# Patient Record
Sex: MALE | Race: BLACK OR AFRICAN AMERICAN | Employment: FULL TIME | URBAN - METROPOLITAN AREA
[De-identification: names, ages, dates, MRNs, and addresses within clinical notes are randomized per-mention and may not be internally consistent; named-entity substitution may affect disease eponyms.]

---

## 2017-06-30 ENCOUNTER — APPOINTMENT (OUTPATIENT)
Dept: GENERAL RADIOLOGY | Facility: CLINIC | Age: 62
DRG: 287 | End: 2017-06-30
Attending: EMERGENCY MEDICINE
Payer: COMMERCIAL

## 2017-06-30 ENCOUNTER — HOSPITAL ENCOUNTER (INPATIENT)
Facility: CLINIC | Age: 62
LOS: 1 days | Discharge: HOME | DRG: 287 | End: 2017-07-01
Attending: EMERGENCY MEDICINE | Admitting: INTERNAL MEDICINE
Payer: COMMERCIAL

## 2017-06-30 DIAGNOSIS — K21.9 ESOPHAGEAL REFLUX: ICD-10-CM

## 2017-06-30 DIAGNOSIS — R79.89 ELEVATED TROPONIN: Primary | ICD-10-CM

## 2017-06-30 DIAGNOSIS — I24.9 ACS (ACUTE CORONARY SYNDROME) (H): ICD-10-CM

## 2017-06-30 DIAGNOSIS — I10 ESSENTIAL HYPERTENSION, MALIGNANT: ICD-10-CM

## 2017-06-30 DIAGNOSIS — R07.9 CHEST PAIN, UNSPECIFIED: ICD-10-CM

## 2017-06-30 LAB
ANION GAP SERPL CALCULATED.3IONS-SCNC: 11 MMOL/L (ref 3–14)
BASOPHILS # BLD AUTO: 0 10E9/L (ref 0–0.2)
BASOPHILS NFR BLD AUTO: 0.3 %
BUN SERPL-MCNC: 17 MG/DL (ref 7–30)
CALCIUM SERPL-MCNC: 9.6 MG/DL (ref 8.5–10.1)
CHLORIDE SERPL-SCNC: 103 MMOL/L (ref 94–109)
CO2 SERPL-SCNC: 26 MMOL/L (ref 20–32)
CREAT SERPL-MCNC: 1.01 MG/DL (ref 0.66–1.25)
DIFFERENTIAL METHOD BLD: NORMAL
EOSINOPHIL # BLD AUTO: 0.1 10E9/L (ref 0–0.7)
EOSINOPHIL NFR BLD AUTO: 1.1 %
ERYTHROCYTE [DISTWIDTH] IN BLOOD BY AUTOMATED COUNT: 13.6 % (ref 10–15)
GFR SERPL CREATININE-BSD FRML MDRD: 75 ML/MIN/1.7M2
GLUCOSE SERPL-MCNC: 101 MG/DL (ref 70–99)
HCT VFR BLD AUTO: 44 % (ref 40–53)
HGB BLD-MCNC: 14.4 G/DL (ref 13.3–17.7)
IMM GRANULOCYTES # BLD: 0 10E9/L (ref 0–0.4)
IMM GRANULOCYTES NFR BLD: 0.1 %
LYMPHOCYTES # BLD AUTO: 1.8 10E9/L (ref 0.8–5.3)
LYMPHOCYTES NFR BLD AUTO: 25.2 %
MCH RBC QN AUTO: 27.2 PG (ref 26.5–33)
MCHC RBC AUTO-ENTMCNC: 32.7 G/DL (ref 31.5–36.5)
MCV RBC AUTO: 83 FL (ref 78–100)
MONOCYTES # BLD AUTO: 0.5 10E9/L (ref 0–1.3)
MONOCYTES NFR BLD AUTO: 7.4 %
NEUTROPHILS # BLD AUTO: 4.8 10E9/L (ref 1.6–8.3)
NEUTROPHILS NFR BLD AUTO: 65.9 %
NRBC # BLD AUTO: 0 10*3/UL
NRBC BLD AUTO-RTO: 0 /100
PLATELET # BLD AUTO: 251 10E9/L (ref 150–450)
POTASSIUM SERPL-SCNC: 4.1 MMOL/L (ref 3.4–5.3)
RBC # BLD AUTO: 5.29 10E12/L (ref 4.4–5.9)
SODIUM SERPL-SCNC: 140 MMOL/L (ref 133–144)
TROPONIN I SERPL-MCNC: 0.04 UG/L (ref 0–0.04)
WBC # BLD AUTO: 7.2 10E9/L (ref 4–11)

## 2017-06-30 PROCEDURE — 84484 ASSAY OF TROPONIN QUANT: CPT | Performed by: EMERGENCY MEDICINE

## 2017-06-30 PROCEDURE — 84484 ASSAY OF TROPONIN QUANT: CPT | Performed by: NURSE PRACTITIONER

## 2017-06-30 PROCEDURE — 93010 ELECTROCARDIOGRAM REPORT: CPT | Performed by: INTERNAL MEDICINE

## 2017-06-30 PROCEDURE — 25000128 H RX IP 250 OP 636: Performed by: EMERGENCY MEDICINE

## 2017-06-30 PROCEDURE — 93010 ELECTROCARDIOGRAM REPORT: CPT | Mod: 59 | Performed by: EMERGENCY MEDICINE

## 2017-06-30 PROCEDURE — 99207 ZZC APP CREDIT; MD BILLING SHARED VISIT: CPT | Mod: 25 | Performed by: EMERGENCY MEDICINE

## 2017-06-30 PROCEDURE — 71020 XR CHEST 2 VW: CPT

## 2017-06-30 PROCEDURE — 99219 ZZC INITIAL OBSERVATION CARE,LEVL II: CPT | Mod: Z6 | Performed by: NURSE PRACTITIONER

## 2017-06-30 PROCEDURE — 25000132 ZZH RX MED GY IP 250 OP 250 PS 637: Performed by: EMERGENCY MEDICINE

## 2017-06-30 PROCEDURE — 99285 EMERGENCY DEPT VISIT HI MDM: CPT | Mod: 25 | Performed by: EMERGENCY MEDICINE

## 2017-06-30 PROCEDURE — 93005 ELECTROCARDIOGRAM TRACING: CPT

## 2017-06-30 PROCEDURE — G0378 HOSPITAL OBSERVATION PER HR: HCPCS

## 2017-06-30 PROCEDURE — 96360 HYDRATION IV INFUSION INIT: CPT | Performed by: EMERGENCY MEDICINE

## 2017-06-30 PROCEDURE — 96361 HYDRATE IV INFUSION ADD-ON: CPT | Performed by: EMERGENCY MEDICINE

## 2017-06-30 PROCEDURE — 36415 COLL VENOUS BLD VENIPUNCTURE: CPT | Performed by: NURSE PRACTITIONER

## 2017-06-30 PROCEDURE — 80048 BASIC METABOLIC PNL TOTAL CA: CPT | Performed by: EMERGENCY MEDICINE

## 2017-06-30 PROCEDURE — 93308 TTE F-UP OR LMTD: CPT | Mod: 26 | Performed by: EMERGENCY MEDICINE

## 2017-06-30 PROCEDURE — 85025 COMPLETE CBC W/AUTO DIFF WBC: CPT | Performed by: EMERGENCY MEDICINE

## 2017-06-30 PROCEDURE — 83735 ASSAY OF MAGNESIUM: CPT | Performed by: NURSE PRACTITIONER

## 2017-06-30 PROCEDURE — 93005 ELECTROCARDIOGRAM TRACING: CPT | Performed by: EMERGENCY MEDICINE

## 2017-06-30 PROCEDURE — 93308 TTE F-UP OR LMTD: CPT | Performed by: EMERGENCY MEDICINE

## 2017-06-30 RX ORDER — ALUMINA, MAGNESIA, AND SIMETHICONE 2400; 2400; 240 MG/30ML; MG/30ML; MG/30ML
15-30 SUSPENSION ORAL EVERY 4 HOURS PRN
Status: DISCONTINUED | OUTPATIENT
Start: 2017-06-30 | End: 2017-07-01 | Stop reason: HOSPADM

## 2017-06-30 RX ORDER — ASPIRIN 81 MG/1
81 TABLET, CHEWABLE ORAL DAILY
Status: DISCONTINUED | OUTPATIENT
Start: 2017-07-01 | End: 2017-07-01

## 2017-06-30 RX ORDER — NALOXONE HYDROCHLORIDE 0.4 MG/ML
.1-.4 INJECTION, SOLUTION INTRAMUSCULAR; INTRAVENOUS; SUBCUTANEOUS
Status: DISCONTINUED | OUTPATIENT
Start: 2017-06-30 | End: 2017-07-01 | Stop reason: HOSPADM

## 2017-06-30 RX ORDER — ACETAMINOPHEN 325 MG/1
650 TABLET ORAL EVERY 4 HOURS PRN
Status: DISCONTINUED | OUTPATIENT
Start: 2017-06-30 | End: 2017-07-01 | Stop reason: HOSPADM

## 2017-06-30 RX ORDER — LISINOPRIL 10 MG/1
10 TABLET ORAL DAILY
Status: DISCONTINUED | OUTPATIENT
Start: 2017-07-01 | End: 2017-06-30

## 2017-06-30 RX ORDER — LISINOPRIL 20 MG/1
20 TABLET ORAL DAILY
Status: DISCONTINUED | OUTPATIENT
Start: 2017-07-01 | End: 2017-07-01 | Stop reason: HOSPADM

## 2017-06-30 RX ORDER — SODIUM CHLORIDE 9 MG/ML
1000 INJECTION, SOLUTION INTRAVENOUS CONTINUOUS
Status: DISCONTINUED | OUTPATIENT
Start: 2017-06-30 | End: 2017-07-01 | Stop reason: HOSPADM

## 2017-06-30 RX ORDER — HYDROCHLOROTHIAZIDE 12.5 MG/1
25 CAPSULE ORAL DAILY
Status: DISCONTINUED | OUTPATIENT
Start: 2017-07-01 | End: 2017-06-30

## 2017-06-30 RX ORDER — NITROGLYCERIN 0.4 MG/1
0.4 TABLET SUBLINGUAL EVERY 5 MIN PRN
Status: DISCONTINUED | OUTPATIENT
Start: 2017-06-30 | End: 2017-07-01 | Stop reason: HOSPADM

## 2017-06-30 RX ORDER — LIDOCAINE 40 MG/G
CREAM TOPICAL
Status: DISCONTINUED | OUTPATIENT
Start: 2017-06-30 | End: 2017-07-01 | Stop reason: HOSPADM

## 2017-06-30 RX ORDER — ASPIRIN 81 MG/1
162 TABLET, CHEWABLE ORAL ONCE
Status: COMPLETED | OUTPATIENT
Start: 2017-06-30 | End: 2017-06-30

## 2017-06-30 RX ORDER — ACETAMINOPHEN 650 MG/1
650 SUPPOSITORY RECTAL EVERY 4 HOURS PRN
Status: DISCONTINUED | OUTPATIENT
Start: 2017-06-30 | End: 2017-07-01 | Stop reason: HOSPADM

## 2017-06-30 RX ADMIN — SODIUM CHLORIDE 1000 ML: 9 INJECTION, SOLUTION INTRAVENOUS at 21:31

## 2017-06-30 RX ADMIN — ASPIRIN 81 MG 162 MG: 81 TABLET ORAL at 20:08

## 2017-06-30 RX ADMIN — SODIUM CHLORIDE 1000 ML: 9 INJECTION, SOLUTION INTRAVENOUS at 20:24

## 2017-06-30 NOTE — IP AVS SNAPSHOT
[address] Observ[address] [address]    Phone:  [phone]    Fax:  [phone]                                       After Visit Summary   6/30/2017    Leandro Womack    MRN: [id number]           After Visit Summary Signature Page     I have received my discharge instructions, and my questions have been answered. I have discussed any challenges I see with this plan with the nurse or doctor.    ..........................................................................................................................................  Patient/Patient Representative Signature      ..........................................................................................................................................  Patient Representative Print Name and Relationship to Patient    ..................................................               ................................................  Date                                            Time    ..........................................................................................................................................  Reviewed by Signature/Title    ...................................................              ..............................................  Date                                                            Time

## 2017-06-30 NOTE — IP AVS SNAPSHOT
MRN:[id number]                      After Visit Summary   6/30/2017    Leandro Womack    MRN: [id number]           Thank you!     Thank you for choosing [facility] for your care. Our goal is always to provide you with excellent care. Hearing back from our patients is one way we can continue to improve our services. Please take a few minutes to complete the written survey that you may receive in the mail after you visit with us. Thank you!        Patient Information     Date Of Birth          1955        Designated Caregiver       Most Recent Value    Caregiver    Will someone help with your care after discharge? yes    Name of designated caregiver JAMES    Phone number of caregiver [phone]    Caregiver address [address] [address]      About your hospital stay     You were admitted on:  June 30, 2017 You last received care in the:  Unit [facility] Observation [facility]    You were discharged on:  July 1, 2017        Reason for your hospital stay       You were admitted to the hospital because your blood tests indicated damage to the heart.  We performed a coronary angiogram to determine if there were plaques in the blood vessels to your heart and if you had a heart attack.  The coronary angiogram was normal.  The damage to your heart may have been due to inflammation, or irritation of the heart muscle which is most commonly due to a viral infection, even if you did not have any symptoms.  You should have a cardiac MRI ([facility] should call you to schedule it) to evaluate your heart further.  You should follow up with Dr. Ryanne Alanis in our cardiology clinic after the MRI.                  Who to Call     For medical emergencies, please call [phone].  For non-urgent questions about your medical care, please call your primary care provider or clinic, [phone]          Attending Provider     Provider Specialty    Zachery Domínguez MD Emergency Medicine    Morgan Rowan MD Emergency Medicine     Ryanne Alanis MD Internal Medicine       Primary Care Provider Office Phone # Fax #    Sukhi Juarez [phone]      After Care Instructions     Activity       Your activity upon discharge: Avoid vigorous exertion (jogging, running, biking, etc) until you follow up with Dr. Alanis.            Diet       Follow this diet upon discharge: Low salt (sodium <2400 mg per day) diet.                  Follow-up Appointments     Adult [facility] Follow-up and recommended labs and tests       Patient is to have a cardiac MRI.  Patient should follow up with Dr. Ryanne Alanis, at [facility], after the cardiac MRI.    Appointments on [place] and/or [facility] (with [facility] or [facility] provider or service). Call [phone] if you haven't heard regarding these appointments within 7 days of discharge.                  Future tests that were ordered for you     MRI Cardiac w/contrast       Administration of IV contrast (contrast agent, dose, and amount) will be tailored to this examination per the appropriate written protocol listed in the Protocol E-Book, or by the supervising imaging provider.                  Pending Results     Date and Time Order Name Status Description    7/1/2017 0737 EKG 12-lead, complete Preliminary     6/30/2017 2321 EKG 12-lead, complete Preliminary     6/30/2017 2016 EKG 12 lead Preliminary     6/30/2017 1949 POC US ECHO LIMITED In process             Statement of Approval     Ordered          07/01/17 1606  I have reviewed and agree with all the recommendations and orders detailed in this document.  EFFECTIVE NOW     Approved and electronically signed by:  Gamaliel Mckeon MD             Admission Information     Date & Time Provider Department Dept. Phone    6/30/2017 Ryanne Alanis MD Unit 6D Observation [facility] [place] [phone]      Your Vitals Were     Blood Pressure Pulse Temperature Respirations Weight Pulse Oximetry    114/78 (BP Location: Left  "arm) 104 98.3  F (36.8  C) (Oral) 18 192.1 kg (423 lb 6.4 oz) 98%      MyChart Information     [employer] lets you send messages to your doctor, view your test results, renew your prescriptions, schedule appointments and more. To sign up, go to [email or web address] . Click on \"Log in\" on the left side of the screen, which will take you to the Welcome page. Then click on \"Sign up Now\" on the right side of the page.     You will be asked to enter the access code listed below, as well as some personal information. Please follow the directions to create your username and password.     Your access code is: [email or web address]  Expires: 2017  4:10 PM     Your access code will  in 90 days. If you need help or a new code, please call your [facility] clinic or [phone].        Care EveryWhere ID     This is your Care EveryWhere ID. This could be used by other organizations to access your [facility] medical records  [id number]        Equal Access to Services     [facility] : Hadsree Camilo, waindy mccray, qamallory evangelistaalnancy campbell, gio simmons. So [facility] [phone].    ATENCIÓN: Si habla español, tiene a ni disposición servicios gratuitos de asistencia lingüística. Jojo al [phone].    We comply with applicable federal civil rights laws and [place] laws. We do not discriminate on the basis of race, color, national origin, age, disability sex, sexual orientation or gender identity.               Review of your medicines      CONTINUE these medicines which have NOT CHANGED        Dose / Directions    ASPIRIN PO        Dose:  81 mg   Take 81 mg by mouth daily   Refills:  0       DICLOFENAC SODIUM PO        Dose:  50 mg   Take 50 mg by mouth 2 times daily   Refills:  0       HYDROCHLOROTHIAZIDE PO        Dose:  25 mg   Take 25 mg by mouth daily   Refills:  0       LISINOPRIL PO        Dose:  10 mg   Take 10 mg by mouth daily   Refills:  0       OMEPRAZOLE PO        " Dose:  20 mg   Take 20 mg by mouth every morning   Refills:  0                Protect others around you: Learn how to safely use, store and throw away your medicines at www.disposemymeds.org.             Medication List: This is a list of all your medications and when to take them. Check marks below indicate your daily home schedule. Keep this list as a reference.      Medications           Morning Afternoon Evening Bedtime As Needed    ASPIRIN PO   Take 81 mg by mouth daily   Last time this was given:  81 mg on 7/1/2017  9:13 AM                                DICLOFENAC SODIUM PO   Take 50 mg by mouth 2 times daily                                HYDROCHLOROTHIAZIDE PO   Take 25 mg by mouth daily                                LISINOPRIL PO   Take 10 mg by mouth daily   Last time this was given:  20 mg on 7/1/2017  9:10 AM                                OMEPRAZOLE PO   Take 20 mg by mouth every morning

## 2017-07-01 ENCOUNTER — APPOINTMENT (OUTPATIENT)
Dept: CARDIOLOGY | Facility: CLINIC | Age: 62
DRG: 287 | End: 2017-07-01
Attending: STUDENT IN AN ORGANIZED HEALTH CARE EDUCATION/TRAINING PROGRAM
Payer: COMMERCIAL

## 2017-07-01 VITALS
WEIGHT: 315 LBS | RESPIRATION RATE: 18 BRPM | OXYGEN SATURATION: 98 % | DIASTOLIC BLOOD PRESSURE: 78 MMHG | SYSTOLIC BLOOD PRESSURE: 114 MMHG | HEART RATE: 104 BPM | TEMPERATURE: 98.3 F

## 2017-07-01 PROBLEM — I21.4 NSTEMI (NON-ST ELEVATED MYOCARDIAL INFARCTION) (H): Status: ACTIVE | Noted: 2017-07-01

## 2017-07-01 LAB
CHOLEST SERPL-MCNC: 142 MG/DL
HDLC SERPL-MCNC: 52 MG/DL
LDLC SERPL CALC-MCNC: 73 MG/DL
MAGNESIUM SERPL-MCNC: 1.8 MG/DL (ref 1.6–2.3)
NONHDLC SERPL-MCNC: 90 MG/DL
TRIGL SERPL-MCNC: 85 MG/DL
TROPONIN I SERPL-MCNC: 0.44 UG/L (ref 0–0.04)
TROPONIN I SERPL-MCNC: 0.62 UG/L (ref 0–0.04)

## 2017-07-01 PROCEDURE — 36415 COLL VENOUS BLD VENIPUNCTURE: CPT | Performed by: NURSE PRACTITIONER

## 2017-07-01 PROCEDURE — 84484 ASSAY OF TROPONIN QUANT: CPT | Performed by: NURSE PRACTITIONER

## 2017-07-01 PROCEDURE — 27210946 ZZH KIT HC TOTES DISP CR8

## 2017-07-01 PROCEDURE — 40000141 ZZH STATISTIC PERIPHERAL IV START W/O US GUIDANCE

## 2017-07-01 PROCEDURE — 27210762 ZZH DEVICE SUTURELESS SECUREMENT EA CR2

## 2017-07-01 PROCEDURE — 99238 HOSP IP/OBS DSCHRG MGMT 30/<: CPT | Mod: 25 | Performed by: INTERNAL MEDICINE

## 2017-07-01 PROCEDURE — 80061 LIPID PANEL: CPT | Performed by: NURSE PRACTITIONER

## 2017-07-01 PROCEDURE — C1894 INTRO/SHEATH, NON-LASER: HCPCS

## 2017-07-01 PROCEDURE — 25500064 ZZH RX 255 OP 636: Performed by: INTERNAL MEDICINE

## 2017-07-01 PROCEDURE — 93306 TTE W/DOPPLER COMPLETE: CPT | Mod: 26 | Performed by: INTERNAL MEDICINE

## 2017-07-01 PROCEDURE — C1769 GUIDE WIRE: HCPCS

## 2017-07-01 PROCEDURE — 25000128 H RX IP 250 OP 636: Performed by: STUDENT IN AN ORGANIZED HEALTH CARE EDUCATION/TRAINING PROGRAM

## 2017-07-01 PROCEDURE — 12000001 ZZH R&B MED SURG/OB UMMC

## 2017-07-01 PROCEDURE — 25000132 ZZH RX MED GY IP 250 OP 250 PS 637: Performed by: NURSE PRACTITIONER

## 2017-07-01 PROCEDURE — 27210787 ZZH MANIFOLD CR2

## 2017-07-01 PROCEDURE — 25000128 H RX IP 250 OP 636: Performed by: EMERGENCY MEDICINE

## 2017-07-01 PROCEDURE — 93458 L HRT ARTERY/VENTRICLE ANGIO: CPT

## 2017-07-01 PROCEDURE — 93458 L HRT ARTERY/VENTRICLE ANGIO: CPT | Mod: 26 | Performed by: INTERNAL MEDICINE

## 2017-07-01 PROCEDURE — B2111ZZ FLUOROSCOPY OF MULTIPLE CORONARY ARTERIES USING LOW OSMOLAR CONTRAST: ICD-10-PCS | Performed by: INTERNAL MEDICINE

## 2017-07-01 PROCEDURE — 27210843 ZZH DEVICE COMPRESSION CR12

## 2017-07-01 PROCEDURE — G0378 HOSPITAL OBSERVATION PER HR: HCPCS

## 2017-07-01 PROCEDURE — 93005 ELECTROCARDIOGRAM TRACING: CPT

## 2017-07-01 PROCEDURE — 25000125 ZZHC RX 250: Performed by: NURSE PRACTITIONER

## 2017-07-01 PROCEDURE — 27211089 ZZH KIT ACIST INJECTOR CR3

## 2017-07-01 PROCEDURE — 4A023N7 MEASUREMENT OF CARDIAC SAMPLING AND PRESSURE, LEFT HEART, PERCUTANEOUS APPROACH: ICD-10-PCS | Performed by: INTERNAL MEDICINE

## 2017-07-01 PROCEDURE — 25000132 ZZH RX MED GY IP 250 OP 250 PS 637: Performed by: STUDENT IN AN ORGANIZED HEALTH CARE EDUCATION/TRAINING PROGRAM

## 2017-07-01 PROCEDURE — 93010 ELECTROCARDIOGRAM REPORT: CPT | Performed by: INTERNAL MEDICINE

## 2017-07-01 PROCEDURE — 25000125 ZZHC RX 250: Performed by: STUDENT IN AN ORGANIZED HEALTH CARE EDUCATION/TRAINING PROGRAM

## 2017-07-01 PROCEDURE — 40000264 ECHO COMPLETE WITH OPTISON

## 2017-07-01 PROCEDURE — B2151ZZ FLUOROSCOPY OF LEFT HEART USING LOW OSMOLAR CONTRAST: ICD-10-PCS | Performed by: INTERNAL MEDICINE

## 2017-07-01 RX ORDER — NALOXONE HYDROCHLORIDE 0.4 MG/ML
.1-.4 INJECTION, SOLUTION INTRAMUSCULAR; INTRAVENOUS; SUBCUTANEOUS
Status: DISCONTINUED | OUTPATIENT
Start: 2017-07-01 | End: 2017-07-01

## 2017-07-01 RX ORDER — METHYLPREDNISOLONE SODIUM SUCCINATE 125 MG/2ML
125 INJECTION, POWDER, LYOPHILIZED, FOR SOLUTION INTRAMUSCULAR; INTRAVENOUS
Status: DISCONTINUED | OUTPATIENT
Start: 2017-07-01 | End: 2017-07-01

## 2017-07-01 RX ORDER — HEPARIN SODIUM 10000 [USP'U]/100ML
0-3500 INJECTION, SOLUTION INTRAVENOUS CONTINUOUS
Status: DISCONTINUED | OUTPATIENT
Start: 2017-07-01 | End: 2017-07-01 | Stop reason: HOSPADM

## 2017-07-01 RX ORDER — ATROPINE SULFATE 0.1 MG/ML
.5-1 INJECTION INTRAVENOUS
Status: DISCONTINUED | OUTPATIENT
Start: 2017-07-01 | End: 2017-07-01

## 2017-07-01 RX ORDER — FLUMAZENIL 0.1 MG/ML
0.2 INJECTION, SOLUTION INTRAVENOUS
Status: DISCONTINUED | OUTPATIENT
Start: 2017-07-01 | End: 2017-07-01

## 2017-07-01 RX ORDER — VERAPAMIL HYDROCHLORIDE 2.5 MG/ML
1-5 INJECTION, SOLUTION INTRAVENOUS
Status: DISCONTINUED | OUTPATIENT
Start: 2017-07-01 | End: 2017-07-01

## 2017-07-01 RX ORDER — METOPROLOL TARTRATE 1 MG/ML
5 INJECTION, SOLUTION INTRAVENOUS EVERY 5 MIN PRN
Status: DISCONTINUED | OUTPATIENT
Start: 2017-07-01 | End: 2017-07-01

## 2017-07-01 RX ORDER — ENALAPRILAT 1.25 MG/ML
1.25-2.5 INJECTION INTRAVENOUS
Status: DISCONTINUED | OUTPATIENT
Start: 2017-07-01 | End: 2017-07-01

## 2017-07-01 RX ORDER — NALOXONE HYDROCHLORIDE 0.4 MG/ML
.2-.4 INJECTION, SOLUTION INTRAMUSCULAR; INTRAVENOUS; SUBCUTANEOUS
Status: DISCONTINUED | OUTPATIENT
Start: 2017-07-01 | End: 2017-07-01

## 2017-07-01 RX ORDER — EPTIFIBATIDE 2 MG/ML
180 INJECTION, SOLUTION INTRAVENOUS EVERY 10 MIN PRN
Status: DISCONTINUED | OUTPATIENT
Start: 2017-07-01 | End: 2017-07-01

## 2017-07-01 RX ORDER — LIDOCAINE HYDROCHLORIDE 10 MG/ML
30 INJECTION, SOLUTION EPIDURAL; INFILTRATION; INTRACAUDAL; PERINEURAL
Status: DISCONTINUED | OUTPATIENT
Start: 2017-07-01 | End: 2017-07-01

## 2017-07-01 RX ORDER — NICARDIPINE HYDROCHLORIDE 2.5 MG/ML
100 INJECTION INTRAVENOUS
Status: DISCONTINUED | OUTPATIENT
Start: 2017-07-01 | End: 2017-07-01

## 2017-07-01 RX ORDER — FENTANYL CITRATE 50 UG/ML
25-50 INJECTION, SOLUTION INTRAMUSCULAR; INTRAVENOUS
Status: DISCONTINUED | OUTPATIENT
Start: 2017-07-01 | End: 2017-07-01

## 2017-07-01 RX ORDER — PROTAMINE SULFATE 10 MG/ML
1-5 INJECTION, SOLUTION INTRAVENOUS
Status: DISCONTINUED | OUTPATIENT
Start: 2017-07-01 | End: 2017-07-01

## 2017-07-01 RX ORDER — FLUMAZENIL 0.1 MG/ML
0.2 INJECTION, SOLUTION INTRAVENOUS
Status: DISCONTINUED | OUTPATIENT
Start: 2017-07-01 | End: 2017-07-01 | Stop reason: HOSPADM

## 2017-07-01 RX ORDER — HYDRALAZINE HYDROCHLORIDE 20 MG/ML
10-20 INJECTION INTRAMUSCULAR; INTRAVENOUS
Status: DISCONTINUED | OUTPATIENT
Start: 2017-07-01 | End: 2017-07-01

## 2017-07-01 RX ORDER — PRASUGREL 10 MG/1
10-60 TABLET, FILM COATED ORAL
Status: DISCONTINUED | OUTPATIENT
Start: 2017-07-01 | End: 2017-07-01

## 2017-07-01 RX ORDER — FENTANYL CITRATE 50 UG/ML
25-50 INJECTION, SOLUTION INTRAMUSCULAR; INTRAVENOUS
Status: DISCONTINUED | OUTPATIENT
Start: 2017-07-01 | End: 2017-07-01 | Stop reason: HOSPADM

## 2017-07-01 RX ORDER — ARGATROBAN 1 MG/ML
150 INJECTION, SOLUTION INTRAVENOUS
Status: DISCONTINUED | OUTPATIENT
Start: 2017-07-01 | End: 2017-07-01

## 2017-07-01 RX ORDER — ONDANSETRON 2 MG/ML
4 INJECTION INTRAMUSCULAR; INTRAVENOUS EVERY 4 HOURS PRN
Status: DISCONTINUED | OUTPATIENT
Start: 2017-07-01 | End: 2017-07-01

## 2017-07-01 RX ORDER — HEPARIN SODIUM 1000 [USP'U]/ML
1000-10000 INJECTION, SOLUTION INTRAVENOUS; SUBCUTANEOUS EVERY 5 MIN PRN
Status: DISCONTINUED | OUTPATIENT
Start: 2017-07-01 | End: 2017-07-01

## 2017-07-01 RX ORDER — ARGATROBAN 1 MG/ML
350 INJECTION, SOLUTION INTRAVENOUS
Status: DISCONTINUED | OUTPATIENT
Start: 2017-07-01 | End: 2017-07-01

## 2017-07-01 RX ORDER — PROMETHAZINE HYDROCHLORIDE 25 MG/ML
6.25-25 INJECTION, SOLUTION INTRAMUSCULAR; INTRAVENOUS EVERY 4 HOURS PRN
Status: DISCONTINUED | OUTPATIENT
Start: 2017-07-01 | End: 2017-07-01

## 2017-07-01 RX ORDER — DOBUTAMINE HYDROCHLORIDE 200 MG/100ML
2-20 INJECTION INTRAVENOUS CONTINUOUS PRN
Status: DISCONTINUED | OUTPATIENT
Start: 2017-07-01 | End: 2017-07-01

## 2017-07-01 RX ORDER — POTASSIUM CHLORIDE 7.45 MG/ML
10 INJECTION INTRAVENOUS
Status: DISCONTINUED | OUTPATIENT
Start: 2017-07-01 | End: 2017-07-01

## 2017-07-01 RX ORDER — NITROGLYCERIN 5 MG/ML
100-500 VIAL (ML) INTRAVENOUS
Status: COMPLETED | OUTPATIENT
Start: 2017-07-01 | End: 2017-07-01

## 2017-07-01 RX ORDER — PHENYLEPHRINE HCL IN 0.9% NACL 1 MG/10 ML
20-100 SYRINGE (ML) INTRAVENOUS
Status: DISCONTINUED | OUTPATIENT
Start: 2017-07-01 | End: 2017-07-01

## 2017-07-01 RX ORDER — IOPAMIDOL 755 MG/ML
100 INJECTION, SOLUTION INTRAVASCULAR ONCE
Status: COMPLETED | OUTPATIENT
Start: 2017-07-01 | End: 2017-07-01

## 2017-07-01 RX ORDER — EPTIFIBATIDE 2 MG/ML
15 INJECTION, SOLUTION INTRAVENOUS CONTINUOUS PRN
Status: DISCONTINUED | OUTPATIENT
Start: 2017-07-01 | End: 2017-07-01

## 2017-07-01 RX ORDER — ADENOSINE 3 MG/ML
12-12000 INJECTION, SOLUTION INTRAVENOUS
Status: DISCONTINUED | OUTPATIENT
Start: 2017-07-01 | End: 2017-07-01

## 2017-07-01 RX ORDER — NIFEDIPINE 10 MG/1
10 CAPSULE ORAL
Status: DISCONTINUED | OUTPATIENT
Start: 2017-07-01 | End: 2017-07-01

## 2017-07-01 RX ORDER — CLOPIDOGREL BISULFATE 75 MG/1
75 TABLET ORAL
Status: DISCONTINUED | OUTPATIENT
Start: 2017-07-01 | End: 2017-07-01

## 2017-07-01 RX ORDER — SODIUM NITROPRUSSIDE 25 MG/ML
100-200 INJECTION INTRAVENOUS
Status: DISCONTINUED | OUTPATIENT
Start: 2017-07-01 | End: 2017-07-01

## 2017-07-01 RX ORDER — PROTAMINE SULFATE 10 MG/ML
25-100 INJECTION, SOLUTION INTRAVENOUS EVERY 5 MIN PRN
Status: DISCONTINUED | OUTPATIENT
Start: 2017-07-01 | End: 2017-07-01

## 2017-07-01 RX ORDER — ASPIRIN 81 MG/1
81 TABLET ORAL DAILY
Status: DISCONTINUED | OUTPATIENT
Start: 2017-07-02 | End: 2017-07-01 | Stop reason: HOSPADM

## 2017-07-01 RX ORDER — NITROGLYCERIN 0.4 MG/1
0.4 TABLET SUBLINGUAL EVERY 5 MIN PRN
Status: DISCONTINUED | OUTPATIENT
Start: 2017-07-01 | End: 2017-07-01

## 2017-07-01 RX ORDER — FUROSEMIDE 10 MG/ML
20-100 INJECTION INTRAMUSCULAR; INTRAVENOUS
Status: DISCONTINUED | OUTPATIENT
Start: 2017-07-01 | End: 2017-07-01

## 2017-07-01 RX ORDER — NITROGLYCERIN 20 MG/100ML
.07-1.04 INJECTION INTRAVENOUS CONTINUOUS PRN
Status: DISCONTINUED | OUTPATIENT
Start: 2017-07-01 | End: 2017-07-01

## 2017-07-01 RX ORDER — CLOPIDOGREL BISULFATE 75 MG/1
300-600 TABLET ORAL
Status: DISCONTINUED | OUTPATIENT
Start: 2017-07-01 | End: 2017-07-01

## 2017-07-01 RX ORDER — NITROGLYCERIN 5 MG/ML
100-200 VIAL (ML) INTRAVENOUS
Status: DISCONTINUED | OUTPATIENT
Start: 2017-07-01 | End: 2017-07-01

## 2017-07-01 RX ORDER — POTASSIUM CHLORIDE 29.8 MG/ML
20 INJECTION INTRAVENOUS
Status: DISCONTINUED | OUTPATIENT
Start: 2017-07-01 | End: 2017-07-01

## 2017-07-01 RX ORDER — ASPIRIN 81 MG/1
81-324 TABLET, CHEWABLE ORAL
Status: DISCONTINUED | OUTPATIENT
Start: 2017-07-01 | End: 2017-07-01

## 2017-07-01 RX ORDER — EPTIFIBATIDE 2 MG/ML
1 INJECTION, SOLUTION INTRAVENOUS CONTINUOUS PRN
Status: DISCONTINUED | OUTPATIENT
Start: 2017-07-01 | End: 2017-07-01

## 2017-07-01 RX ORDER — LIDOCAINE 40 MG/G
CREAM TOPICAL
Status: DISCONTINUED | OUTPATIENT
Start: 2017-07-01 | End: 2017-07-01 | Stop reason: HOSPADM

## 2017-07-01 RX ORDER — DIPHENHYDRAMINE HYDROCHLORIDE 50 MG/ML
25-50 INJECTION INTRAMUSCULAR; INTRAVENOUS
Status: DISCONTINUED | OUTPATIENT
Start: 2017-07-01 | End: 2017-07-01

## 2017-07-01 RX ORDER — DEXTROSE MONOHYDRATE 25 G/50ML
12.5-5 INJECTION, SOLUTION INTRAVENOUS EVERY 30 MIN PRN
Status: DISCONTINUED | OUTPATIENT
Start: 2017-07-01 | End: 2017-07-01

## 2017-07-01 RX ORDER — DOPAMINE HYDROCHLORIDE 160 MG/100ML
2-20 INJECTION, SOLUTION INTRAVENOUS CONTINUOUS PRN
Status: DISCONTINUED | OUTPATIENT
Start: 2017-07-01 | End: 2017-07-01

## 2017-07-01 RX ORDER — LORAZEPAM 2 MG/ML
.5-2 INJECTION INTRAMUSCULAR EVERY 4 HOURS PRN
Status: DISCONTINUED | OUTPATIENT
Start: 2017-07-01 | End: 2017-07-01

## 2017-07-01 RX ORDER — ASPIRIN 325 MG
325 TABLET ORAL
Status: DISCONTINUED | OUTPATIENT
Start: 2017-07-01 | End: 2017-07-01

## 2017-07-01 RX ORDER — ATROPINE SULFATE 0.1 MG/ML
0.5 INJECTION INTRAVENOUS EVERY 5 MIN PRN
Status: DISCONTINUED | OUTPATIENT
Start: 2017-07-01 | End: 2017-07-01 | Stop reason: HOSPADM

## 2017-07-01 RX ORDER — NALOXONE HYDROCHLORIDE 0.4 MG/ML
0.4 INJECTION, SOLUTION INTRAMUSCULAR; INTRAVENOUS; SUBCUTANEOUS EVERY 5 MIN PRN
Status: DISCONTINUED | OUTPATIENT
Start: 2017-07-01 | End: 2017-07-01

## 2017-07-01 RX ADMIN — IOPAMIDOL 100 ML: 755 INJECTION, SOLUTION INTRAVASCULAR at 11:15

## 2017-07-01 RX ADMIN — METOPROLOL TARTRATE 12.5 MG: 25 TABLET, FILM COATED ORAL at 09:10

## 2017-07-01 RX ADMIN — NICARDIPINE HYDROCHLORIDE 200 MCG: 2.5 INJECTION INTRAVENOUS at 10:36

## 2017-07-01 RX ADMIN — ASPIRIN 81 MG CHEWABLE TABLET 81 MG: 81 TABLET CHEWABLE at 09:13

## 2017-07-01 RX ADMIN — HEPARIN SODIUM 5000 UNITS: 1000 INJECTION, SOLUTION INTRAVENOUS; SUBCUTANEOUS at 10:35

## 2017-07-01 RX ADMIN — SODIUM CHLORIDE 1000 ML: 9 INJECTION, SOLUTION INTRAVENOUS at 02:59

## 2017-07-01 RX ADMIN — LISINOPRIL 20 MG: 20 TABLET ORAL at 09:10

## 2017-07-01 RX ADMIN — PANTOPRAZOLE SODIUM 40 MG: 40 INJECTION, POWDER, FOR SOLUTION INTRAVENOUS at 09:10

## 2017-07-01 RX ADMIN — HEPARIN SODIUM 1200 UNITS/HR: 10000 INJECTION, SOLUTION INTRAVENOUS at 09:07

## 2017-07-01 RX ADMIN — NITROGLYCERIN 200 MCG: 5 INJECTION, SOLUTION INTRAVENOUS at 10:36

## 2017-07-01 RX ADMIN — HUMAN ALBUMIN MICROSPHERES AND PERFLUTREN 6 ML: 10; .22 INJECTION, SOLUTION INTRAVENOUS at 09:15

## 2017-07-01 NOTE — ED PROVIDER NOTES
History     Chief Complaint   Patient presents with     Chest Pain     onset 1 hour ago, also had jaw pain shortly after that; seen at  clinic across the street for 12 lead which was normal but no other tests; pain is more of an ache associated with post-burning reflux; denies SOB; no hx of heart problems     HPI  Leandro Womack is a 61-year-old male hypertensive, likely JASMYN, morbidly obese presenting with chest pain  Patient had a stressful day and works as a . Later in the afternoon close to the end of his work day he developed reflux-like sensation with  foggy  chest pain in his epigastrium radiating upward. On the drive home he also experienced some discomfort along his mandible and upper jaw which he initially dismissed but then went to the [facility] and then was referred to our emergency department. ECG at [facility] by report was nonischemic  On the time of arrival here he is essentially chest pain-free other than describing a foggy sensation which he has difficulty characterizing but denies any pain pressure and no current symptoms.    He denies any weakness lightheadedness shortness of breath  New lower extremity edema or pain abdominal pain back pain or any other symptoms whatsoever.    Believes he is generally an anxious person but describes his symptoms today as quite atypical for him usually.    In 2002 he had acute sharp chest pain for which was seen in the emergency department and by his report received a negative workup. This did not include coronary angiogram.   This part of the document was transcribed by Serina Arguello, Medical Scribe.       I have reviewed the Medications, Allergies, Past Medical and Surgical History, and Social History in the Epic system.      PAST MEDICAL HISTORY:   Past Medical History:   Diagnosis Date     Depression      Derangement of medial meniscus      Diverticular disease of colon      Hypertension      Morbid obesity (H)       Osteoarthritis      Vitamin D deficiency        PAST SURGICAL HISTORY: No past surgical history on file.    FAMILY HISTORY: No family history on file.    SOCIAL HISTORY:   Social History   Substance Use Topics     Smoking status: Not on file     Smokeless tobacco: Not on file     Alcohol use Not on file     Review of Systems    Physical Exam   BP: 127/85  Pulse: 104  Temp: 98  F (36.7  C)  Resp: 20  SpO2: 96 %  Physical Exam  GEN: Well appearing, non toxic, cooperative and conversant.   HEENT: The head is normocephalic and atraumatic. Pupils are equal round and reactive to light. Extraocular motions are intact. There is no facial swelling. The neck is nontender and supple.   CV: Regular rate and rhythm without murmurs rubs or gallops. 2+ radial pulses bilaterally.  PULM: Clear to auscultation bilaterally.  ABD: morbidly obese, Soft, nontender, nondistended. Normal bowel sounds.   EXT: Full range of motion.  No edema.  NEURO: Cranial nerves II through XII are intact and symmetric. Bilateral upper and lower extremities grossly show full range of motion without any focal deficits.   SKIN: No rashes, ecchymosis, or lacerations  PSYCH: Calm and cooperative, interactive.     ED Course     ED Course     Procedures    Limited Bedside Cardiac Ultrasound, performed and interpreted by me.   Indication: Chest Pain.  Parasternal long axis and parasternal short axis views were acquired.   Image quality was poor.    Findings:    Global left ventricular function appears intact.  Chambers do not appear dilated.  There is no evidence of free fluid within the pericardium.    IMPRESSION: Grossly normal left ventricular function and chamber size.  No pericardial effusion..  Poor acoustic windows     EKG at 20:17:35.    Chest pain at time of ECG.  ECG interpretted by me within 10 minutes of production  NSR at 96 bpm. Normal axis.  Normal intervals. Nl R-wave progress. No ST-T elevations or depressions. Pathologic T-wave inversion are  absent Prominent P waves in lead 2    Interpretation: Normal ECG             Labs Ordered and Resulted from Time of ED Arrival Up to the Time of Departure from the ED   BASIC METABOLIC PANEL - Abnormal; Notable for the following:        Result Value    Glucose 101 (*)     All other components within normal limits   CBC WITH PLATELETS DIFFERENTIAL   TROPONIN I            Assessments & Plan (with Medical Decision Making)   61-year-old male hypertensive with somewhat atypical chest pain described  DDX:  Acute coronary syndrome, pulmonary embolism, pneumonia, pneumothorax, congestive heart failure, uremia, renal failure, among other causes    Due to the patient s age and somewhat concerning history including jaw pain I believe he is not a good candidate for outpatient follow-up but is appropriate for Emergency Department observation for ACS evaluation with troponins and provocative testing.    The patient agrees to this  His first troponin is non 0  negative of 0.039. We will add an early second troponin at 10:30 PM His labs are otherwise unremarkable  Bedside echo was of poor quality due  to poor acoustic windows, but grossly normal.   Chest X-ray is unremarkable     He was discussed with ABP and admitted for Emergency Department observation   This part of the document was transcribed by Serina Arguello, Medical Scribe.       I have reviewed the nursing notes.    I have reviewed the findings, diagnosis, plan and need for follow up with the patient.    Discharge Medication List as of 7/1/2017  4:11 PM          Final diagnoses:   ACS (acute coronary syndrome) (H)       6/30/2017   [facility], [place], EMERGENCY DEPARTMENT     Zachery Domínguez MD  07/02/17 0113

## 2017-07-01 NOTE — PLAN OF CARE
Problem: Goal Outcome Summary  Goal: Goal Outcome Summary  Outcome: Adequate for Discharge Date Met:  07/01/17  Cath lab procedure completed. TR band deflated per order. Dc orders placed. Denies pain, cp. PIVs dc'd. AVS reviewed, pt states understanding. Dc to home with wife.

## 2017-07-01 NOTE — PROCEDURES
CARDIAC CATH REPORT:     PROCEDURES PERFORMED:   Coronary Angiography    PHYSICIANS:  Attending Physician: Ryanne Alanis MD  Interventional Cardiology Fellow: JOURDAN Shukla MD, PhD    INDICATION:  Leandro Womack is a 61 year old male with hx of morbid obesity, HTN, presented to the ED with typical chest pain, elevated troponin, who presents on an urgent basis. The clinical presentation was NSTEMI (CCS IV).     DESCRIPTION:  1. Consent obtained with discussion of risks.  All questions were answered.  2. Sterile prep and procedure.  3. Location with Sheaths:   Lf Radial Arterial  6 Fr  10 cm [short]  4. Access: Local anesthetic with lidocaine.  A micropuncture 21 guage needle with ultrasound guidance was used to establish vascular access using a modified Seldinger technique.  5. Diagnostic Catheters:   6 Fr  JL 4, 3DRC  6. Estimated blood loss: < 5 ml    MEDICATIONS:  No fentanyl or versed were given.  Heart rate, BP, respiration, oxygen saturation and patient responses were monitored throughout the procedure with the assistance of the RN under my supervision.  >> IA Verapamil and IA NTG were administered to prophylax against radial artery spasm.  >> Antiplatelet Therapy:  mg    Procedures:    CORONARY ANGIOGRAM:   1. Both coronary arteries arise from their respective cusps.  2. Dominance: Right  3. The LM is without angiographic evidence of disease.   4. LAD: Type 3 [LAD supplies the entire apex]. The LAD gives rise to septal perforators, D1 and D2.  Luminal irregularities in mLAD  5. LCX gives rise to OM1 and OM2 vessels. No coronary disease  6. RCA gives rise to PL branches and supplies PDA. Luminal irregularities     Left Heart Cath  1. LV EF is 61.5%, traced  2. LVEDP is 14  3. LV gram with normal wall motion, no MR    Sheath Removal:  The radial sheath was manually removed in the cardiac catheterization laboratory. TR band placed    Contrast: Isovue, 95 ml     COMPLICATIONS:  1. None    SUMMARY:   Normal  coronary arteries    PLAN:   Bedrest per protocol.  Continued medical management and lifestyle modification for cardiovascular risk factor optimization.   Return to the primary inpatient team for further evaluation and management.    The attending interventional cardiologist was present and supervised all critical aspects the procedure.    JOURDAN Shukla  Interventional Fellow

## 2017-07-01 NOTE — PROGRESS NOTES
TR band removed at 1345. Radial site CDI with no bleeding or hematoma noted.   /76  Pulse 104  Temp 98.4  F (36.9  C) (Oral)  Resp 18  Wt (!) 192.1 kg (423 lb 6.4 oz)  SpO2 94%

## 2017-07-01 NOTE — H&P
[facility] ED Observation Admission Note    Chief Complaint   Patient presents with     Chest Pain     onset 1 hour ago, also had jaw pain shortly after that; seen at  clinic across the street for 12 lead which was normal but no other tests; pain is more of an ache associated with post-burning reflux; denies SOB; no hx of heart problems       Assessment/Plan:  [facility] ED Observation Admission Note    Chief Complaint   Patient presents with     Chest Pain     onset 1 hour ago, also had jaw pain shortly after that; seen at  clinic across the street for 12 lead which was normal but no other tests; pain is more of an ache associated with post-burning reflux; denies SOB; no hx of heart problems       Assessment/Plan:  Leandro Womack is a 61 year old male with a history of Morbid obesity, hypertension, osteoarthritis of the right knee, GERD, who presented to the ED with chest pain.     1. Chest pain- Reports chest pain as a foggy epigastric pain radiating to his mandible and upper jaw. Denies any lightheadedness, weakness or shortness of breath. Denies any heart history. No family history of heart disease. Initial troponin 0.039. Second troponin 0.044.  Ekg at PCP clinic was Normal. Bedside ECHO completed and results pending.  Chest xray normal. Patient admitted to ED observation for ACS evaluation with troponins and provocative testing.. He reports a stressful day with a history of anxiety. He noted burning pain in his chest that has improved now. He has a history of hypertension.   - Continuous cardiac monitoring  - Serial troponins   - Dobutamine stress test in am as patient has a history of asthma  - Lipid panel in am  - GI cocktail prn  - IV Protonix BID   - Vital signs q 4 hours  - EKG now and prn chest pain  - PTA Aspirin 81 mg PO daily.   - Add magnesium     2. Hypertension - Take hydrochlorothiazide 25 MG daily and Lisinopril 10 MG daily.   - Continue home hydrochlorothiazide and Lisinopril.     3. Osteoarthritis of the  right knee - PTA diclofenac 50 MG two times a day. Patient does not take this on a regular basis     4. GERD-  Reports does not take Omeprazole but has on medication list.   - Start Protonix IV BID while in observation  - GI cocktail prn.  - If stress test negative, may need to restart Omeprazole.     5. Asthma- does not use inhalers. Occurs when exposed to allergens.     6. Possible JASMYN- Has not been tested but reports most likely has sleep apnea.  - Oxygen prn overnight  - Sleep clinic referral.     HPI:    Leandro Womack is a 61 year old male with a history of Morbid obesity, hypertension, Asthma, osteoarthritis of the right knee, GERD, who presented to the ED with chest pain. Patient was noted to be seen at Urgent care this evening for chest pain. He reported feeling stress out due to work (patient is a ) and has a known history of anxiety. He reported burning chest pain to his PCP which had improved when he was at urgent care. EKG was normal during this visit. Patient noted at the end of his work day today. He developed epigastric pain and a  foggy  feeling in his chest. He noted a new onset of discomfort along his mandible and upper jaw. Patient was instructed to go to the ED with worsening symptoms and presented to [place] ED. Patient was chest pain free when he arrived at the ED. He denied any pain radiating to his jaw. He denies any weakness lightheadedness shortness of breath. Reports to ED staff, he is an  anxious person but describes his symptoms today as quite atypical for him usually.     In 2001, he was seen at [facility]  ED for chest pain. EKG showed Normal sinus rhythm  with non specific ECG changes. Patient elected to discharge and if pain returned to get a stress test. Per Care everywhere, no stress test was completed.    In the ED Vitals: BP:127/85 Pulse: 104 Temp: 98 Resp: 20 SP02:96% Labs: Sodium 140, Potassium 4.1, Creatinine 1.01, BUN 17, GFR >90, Troponin 0.039, WBC 7.2, Hgb  14.4, Plt 251,  Medications: Received 2,000 mL IV x 1, Aspirin 162 mg PO x 1. Imaging: POC ECHO completed in ED and pending results. Chest xray negative for infection.  Consults: Plan:  Admitted to observation for ACS evaluation with troponins and provocative testing.    On admission to the observation unit the patient was stable    History:    Past Medical History:   Diagnosis Date     Depression      Derangement of medial meniscus      Diverticular disease of colon      Hypertension      Morbid obesity (H)      Osteoarthritis      Vitamin D deficiency        No past surgical history on file.    No family history on file.    Social History     Social History     Marital status:      Spouse name: N/A     Number of children: N/A     Years of education: N/A     Occupational History     Not on file.     Social History Main Topics     Smoking status: Not on file     Smokeless tobacco: Not on file     Alcohol use Not on file     Drug use: Not on file     Sexual activity: Not on file     Other Topics Concern     Not on file     Social History Narrative     No narrative on file         No current facility-administered medications on file prior to encounter.   No current outpatient prescriptions on file prior to encounter.    Data:    Results for orders placed or performed during the hospital encounter of 06/30/17   Chest XR,  PA & LAT    Narrative    XR CHEST 2 VW   6/30/2017 9:48 PM     HISTORY: Chest pain    COMPARISON: None.    FINDINGS: The heart is negative.  The lungs are clear. The pulmonary  vasculature is normal.  The bones and soft tissues are unremarkable.      Impression    IMPRESSION: No active infiltrate.        MARLON PAREDES MD   CBC with platelets differential   Result Value Ref Range    WBC 7.2 4.0 - 11.0 10e9/L    RBC Count 5.29 4.4 - 5.9 10e12/L    Hemoglobin 14.4 13.3 - 17.7 g/dL    Hematocrit 44.0 40.0 - 53.0 %    MCV 83 78 - 100 fl    MCH 27.2 26.5 - 33.0 pg    MCHC 32.7 31.5 - 36.5 g/dL    RDW  13.6 10.0 - 15.0 %    Platelet Count 251 150 - 450 10e9/L    Diff Method Automated Method     % Neutrophils 65.9 %    % Lymphocytes 25.2 %    % Monocytes 7.4 %    % Eosinophils 1.1 %    % Basophils 0.3 %    % Immature Granulocytes 0.1 %    Nucleated RBCs 0 0 /100    Absolute Neutrophil 4.8 1.6 - 8.3 10e9/L    Absolute Lymphocytes 1.8 0.8 - 5.3 10e9/L    Absolute Monocytes 0.5 0.0 - 1.3 10e9/L    Absolute Eosinophils 0.1 0.0 - 0.7 10e9/L    Absolute Basophils 0.0 0.0 - 0.2 10e9/L    Abs Immature Granulocytes 0.0 0 - 0.4 10e9/L    Absolute Nucleated RBC 0.0    Basic metabolic panel   Result Value Ref Range    Sodium 140 133 - 144 mmol/L    Potassium 4.1 3.4 - 5.3 mmol/L    Chloride 103 94 - 109 mmol/L    Carbon Dioxide 26 20 - 32 mmol/L    Anion Gap 11 3 - 14 mmol/L    Glucose 101 (H) 70 - 99 mg/dL    Urea Nitrogen 17 7 - 30 mg/dL    Creatinine 1.01 0.66 - 1.25 mg/dL    GFR Estimate 75 >60 mL/min/1.7m2    GFR Estimate If Black >90   GFR Calc   >60 mL/min/1.7m2    Calcium 9.6 8.5 - 10.1 mg/dL   Troponin I   Result Value Ref Range    Troponin I ES 0.039 0.000 - 0.045 ug/L   EKG 12-lead, complete   Result Value Ref Range    Interpretation ECG Click View Image link to view waveform and result             EKG Interpretation:      EKG at 20:17:35.    Chest pain at time of ECG.  ECG interpretted by me within 10 minutes of production  NSR at 96 bpm. Normal axis.  Normal intervals. Nl R-wave progress. No ST-T elevations or depressions. Pathologic T-wave inversion are absent Prominent P waves in lead 2    ROS:    Review Of Systems  Skin: negative  Eyes: negative  Ears/Nose/Throat: negative  Respiratory: No shortness of breath, dyspnea on exertion, cough, or hemoptysis  Cardiovascular: negative  Gastrointestinal: hemorrhoids   Genitourinary: nocturia x 2 per night. Some urgency with urinating. Has seen a urologist  Musculoskeletal: joint pain in right knee   Neurologic: negative  Psychiatric:  anxiety  Hematologic/Lymphatic/Immunologic: allergies  Endocrine: negative  PCP: Dr. Juarez   CARDIAC RISK: Morbidly Obesity, AA, 61 male, HTN.     10 point ROS negative other than the symptoms noted above.      Exam:    Vitals:  B/P: 117/74, T: 98.3, P: 104, R: 19    GEN: Well appearing, non toxic, cooperative and conversant.   HEENT: The head is normocephalic and atraumatic. Pupils are equal round and reactive to light. Extraocular motions are intact. There is no facial swelling. The neck is nontender and supple.   CV: Regular rate and rhythm without murmurs rubs or gallops. 2+ radial pulses bilaterally.  PULM: Clear to auscultation bilaterally.  ABD: morbidly obese, Soft, nontender, nondistended. Normal bowel sounds.   EXT: Full range of motion.  No edema.  NEURO: Cranial nerves II through XII are intact and symmetric. Bilateral upper and lower extremities grossly show full range of motion without any focal deficits.   SKIN: No rashes, ecchymosis, or lacerations  PSYCH: Calm and cooperative, interactive.     Consults: none  FEN: Regular diet/ No caffeine/ NPO at midnight  DVT prophylaxis: Early ambulation  Disposition: Home if troponins negative and stress test negative.     Signed:  SAY Razo, NP  Emergency Department  [phone] Ex [phone]  June 30, 2017 at 11:31 PM

## 2017-07-01 NOTE — DISCHARGE SUMMARY
"Patient ID:  Lenadro Womack  MRN: [id number]  61 year old  YOB: 1955    Observation Admit Date: 6/30/2017    ED Admitting Attending: Zachery Domínguez MD    Transfer Date and Time: July 1, 2017 at 7:43 AM     Transferring Observation Provider: Antonia Cobb, KIRIT, APRN CNP    Admission Diagnoses:     1. ACS (acute coronary syndrome) (H)        Transfer Diagnoses:    1. NSTEMI    Emergency Department and Observation Course:   Adapted from H & P.    \"Leandro Womack is a 61 year old male with a history of Morbid obesity, hypertension, Asthma, osteoarthritis of the right knee, GERD, who presented to the ED with chest pain. Patient was noted to be seen at Urgent care this evening for chest pain. He reported feeling stress out due to work (patient is a ) and has a known history of anxiety. He reported burning chest pain to his PCP which had improved when he was at urgent care. EKG was normal during this visit. Patient noted at the end of his work day today. He developed epigastric pain and a  foggy  feeling in his chest. He noted a new onset of discomfort along his mandible and upper jaw. Patient was instructed to go to the ED with worsening symptoms and presented to [place] ED. Patient was chest pain free when he arrived at the ED. He denied any pain radiating to his jaw. He denies any weakness lightheadedness shortness of breath. Reports to ED staff, he is an  anxious person but describes his symptoms today as quite atypical for him usually.     In 2001, he was seen at [facility]  ED for chest pain. EKG showed Normal sinus rhythm  with non specific ECG changes. Patient elected to discharge and if pain returned to get a stress test. Per Care everywhere, no stress test was completed.     In the ED Vitals: BP:127/85 Pulse: 104 Temp: 98 Resp: 20 SP02:96% Labs: Sodium 140, Potassium 4.1, Creatinine 1.01, BUN 17, GFR >90, Troponin 0.039, WBC 7.2, Hgb 14.4, Plt 251,  Medications: Received 2,000 mL " "IV x 1, Aspirin 162 mg PO x 1. Imaging: POC ECHO completed in ED and pending results. Chest xray negative for infection.  Consults: Plan:  Admitted to observation for ACS evaluation with troponins and provocative testing.\"   While in observation his troponin was trended and was noted to be elevated at 0.620 at 0335 am. The nursing staff report notifying on-call MD who recommended Cardiology consult this am. This writer was not notified of this finding upon assuming care of this patient at 0700 am. However, upon chart review ~ 730 elevated troponin was noted. Cardiology was consulted immediately and assumed care of the patient. EKG was completed and did not show changes. A heparin drip was ordered and he was made NPO. This was discussed with the patient. He was chest pain free.    At this time the patient has failed observation management due to NSTEM and will be transferred to inpatient status under the care of Cardiology.     Consults: Cardiology     DATA:    Transfer Exam:    /77 (BP Location: Left arm)  Pulse 104  Temp 98.4  F (36.9  C) (Oral)  Resp 18  Wt (!) 192.1 kg (423 lb 6.4 oz)  SpO2 94%  Exam:  Constitutional: healthy, alert and no distress  Head: Normocephalic. No masses, lesions, tenderness or abnormalities  Neck: Neck supple. No adenopathy. Thyroid symmetric, normal size,,   ENT: ENT exam normal, no neck nodes or sinus tenderness  Cardiovascular: negative, PMI normal. No lifts, heaves, or thrills. RRR. No murmurs, clicks gallops or rub  Respiratory: negative, Percussion normal. Good diaphragmatic excursion. Lungs clear  Gastrointestinal: Abdomen soft, non-tender. BS normal. No masses, organomegaly  Musculoskeletal: extremities normal- no gross deformities noted, gait normal and normal muscle tone  Skin: no suspicious lesions or rashes  Neurologic: Gait normal. Alert and oriented. Sensation grossly WNL.  Psychiatric: mentation appears normal and affect normal/bright    /77 (BP Location: " Left arm)  Pulse 104  Temp 98.4  F (36.9  C) (Oral)  Resp 18  Wt (!) 192.1 kg (423 lb 6.4 oz)  SpO2 94%      Medications Prior to Admission:    Prescriptions Prior to Admission   Medication Sig Dispense Refill Last Dose     ASPIRIN PO Take 81 mg by mouth daily        DICLOFENAC SODIUM PO Take 50 mg by mouth 2 times daily        OMEPRAZOLE PO Take 20 mg by mouth every morning        HYDROCHLOROTHIAZIDE PO Take 25 mg by mouth daily        LISINOPRIL PO Take 10 mg by mouth daily          Significant Diagnostic Studies:     Results for orders placed or performed during the hospital encounter of 06/30/17   Chest XR,  PA & LAT    Narrative    XR CHEST 2 VW   6/30/2017 9:48 PM     HISTORY: Chest pain    COMPARISON: None.    FINDINGS: The heart is negative.  The lungs are clear. The pulmonary  vasculature is normal.  The bones and soft tissues are unremarkable.      Impression    IMPRESSION: No active infiltrate.        MARLON PAREDES MD   CBC with platelets differential   Result Value Ref Range    WBC 7.2 4.0 - 11.0 10e9/L    RBC Count 5.29 4.4 - 5.9 10e12/L    Hemoglobin 14.4 13.3 - 17.7 g/dL    Hematocrit 44.0 40.0 - 53.0 %    MCV 83 78 - 100 fl    MCH 27.2 26.5 - 33.0 pg    MCHC 32.7 31.5 - 36.5 g/dL    RDW 13.6 10.0 - 15.0 %    Platelet Count 251 150 - 450 10e9/L    Diff Method Automated Method     % Neutrophils 65.9 %    % Lymphocytes 25.2 %    % Monocytes 7.4 %    % Eosinophils 1.1 %    % Basophils 0.3 %    % Immature Granulocytes 0.1 %    Nucleated RBCs 0 0 /100    Absolute Neutrophil 4.8 1.6 - 8.3 10e9/L    Absolute Lymphocytes 1.8 0.8 - 5.3 10e9/L    Absolute Monocytes 0.5 0.0 - 1.3 10e9/L    Absolute Eosinophils 0.1 0.0 - 0.7 10e9/L    Absolute Basophils 0.0 0.0 - 0.2 10e9/L    Abs Immature Granulocytes 0.0 0 - 0.4 10e9/L    Absolute Nucleated RBC 0.0    Basic metabolic panel   Result Value Ref Range    Sodium 140 133 - 144 mmol/L    Potassium 4.1 3.4 - 5.3 mmol/L    Chloride 103 94 - 109 mmol/L    Carbon  Dioxide 26 20 - 32 mmol/L    Anion Gap 11 3 - 14 mmol/L    Glucose 101 (H) 70 - 99 mg/dL    Urea Nitrogen 17 7 - 30 mg/dL    Creatinine 1.01 0.66 - 1.25 mg/dL    GFR Estimate 75 >60 mL/min/1.7m2    GFR Estimate If Black >90   GFR Calc   >60 mL/min/1.7m2    Calcium 9.6 8.5 - 10.1 mg/dL   Troponin I   Result Value Ref Range    Troponin I ES 0.039 0.000 - 0.045 ug/L   Troponin I - Now then in 4 hours x 2    Result Value Ref Range    Troponin I ES 0.620 (HH) 0.000 - 0.045 ug/L   Magnesium   Result Value Ref Range    Magnesium 1.8 1.6 - 2.3 mg/dL   Troponin I   Result Value Ref Range    Troponin I ES 0.439 (HH) 0.000 - 0.045 ug/L   Lipid panel reflex to direct LDL   Result Value Ref Range    Cholesterol 142 <200 mg/dL    Triglycerides 85 <150 mg/dL    HDL Cholesterol 52 >39 mg/dL    LDL Cholesterol Calculated 73 <100 mg/dL    Non HDL Cholesterol 90 <130 mg/dL   EKG 12 lead   Result Value Ref Range    Interpretation ECG Click View Image link to view waveform and result    EKG 12-lead, complete   Result Value Ref Range    Interpretation ECG Click View Image link to view waveform and result    EKG 12-lead, complete   Result Value Ref Range    Interpretation ECG Click View Image link to view waveform and result        Signed:  Antonia Cobb NP  July 1, 2017 at 7:43 AM

## 2017-07-01 NOTE — PROGRESS NOTES
Admitted for chest pain. Pt has denied chest pain and shortness of breath. Trop since admission 0.411, Christian Salima notified. Trop up to 0.620, called placed to Dr. Rowan re: Trop. Per Dr. Rowan, Cards will see Pt in the AM. Resting with out complaints of chest pain. Uneventful night, will continue to monitor. Per NSR per tele.

## 2017-07-01 NOTE — PLAN OF CARE
Problem: Discharge Planning  Goal: Discharge Planning (Adult, OB, Behavioral, Peds)  Observation goals Start: 06/30/17 8235, PRIOR TO DISCHARGE, Routine       Comments: List all goals to be met before discharge home:   - Serial troponins and stress test complete. No in process  - Seen and cleared by consultant if applicable No, pending  - Adequate pain control on oral analgesia : yes, denies cp and sob with ambulation and at rest  - Vital signs normal or at patient baseline Yes  - Safe disposition plan has been identified Not yet  - Nurse to notify provider when observation goals have been met and patient is ready for discharge.

## 2017-07-02 LAB — INTERPRETATION ECG - MUSE: NORMAL

## 2017-07-02 NOTE — DISCHARGE SUMMARY
[facility], [place]    Cardiology Discharge Summary    Date of Admission:  6/30/2017  Date of Discharge:  7/1/2017  4:35 PM  Discharging Attending Provider: Ryanne Alanis MD    Discharge Diagnoses   Elevated troponin with normal coronary arteries on invasive coronary angiography  Morbid obesity  Hypertension, controlled - no changes were made to his antihypertensive regimen    Hospital Course   Leandro Womack was admitted to the emergency department observation unit on 6/30/2017 after presenting to our emergency department with epigastric/chest and jaw pain which he initially thought was acid reflux, not reliably associated with exertion, not accompanied by dyspnea or palpitations or lightheadedness, not pleuritic, not positional, and no reproducible with palpation or movement.  He denied feeling otherwise ill recently.  His EKG showed sinus rhythm with normal axis and precordial R wave progression and no ischemic ST or T wave changes.  Troponin:  0.039 -> 0.620 -> 0.439.  Echocardiography showed normal LV function, EF 55-60%, with no regional wall motion abnormalities.  The RV and valves could not be assessed because of poor image quality.  He was initially treated for a non-ST elevation MI with ASA, beta blockade, and heparin but coronary angiography showed normal coronary arteries.    Myocarditis remains in the differential diagnosis.  He did not have features of severe aortic stenosis on examination.  His history and examination findings were not consistent with a pulmonary embolism or aortic dissection.    He will have a cardiac MRI as an outpatient and follow up with Dr. Alanis in cardiology clinic.    The patient was discussed with Dr. Ryanne Alanis.    Gamaliel Mckeon  [facility]  Pager: [phone]  ______________________________________________________________________    Physical Exam   Vital Signs: Temp: 98.3  F (36.8  C) Temp src: Oral BP: 114/78   Heart Rate: 85  Resp: 18 SpO2: 98 % O2 Device: None (Room air)    Weight: 423 lbs 6.4 oz    General Appearance: Comfortable appearing male sitting up in bed with unlabored breathing.  Respiratory: Vesicular lung sounds.  Cardiovascular: RRR with normal s1 and s2, no murmur, no rub, no RV heave, warm extremities, and no peripheral edema.  The left radial access site was not bleeding, did not have a hematoma, and had a 2/4 pulse.  He had intact strength and sensation in the hand.  GI: abdomen is soft, non-tender.  Skin: no petechiae/ecchymoses.  Not jaundiced.    Significant Results and Procedures   Most Recent 3 CBC's:  Recent Labs   Lab Test  06/30/17 2020   WBC  7.2   HGB  14.4   MCV  83   PLT  251     Most Recent 3 BMP's:  Recent Labs   Lab Test  06/30/17 2020   NA  140   POTASSIUM  4.1   CHLORIDE  103   CO2  26   BUN  17   CR  1.01   ANIONGAP  11   MARYURI  9.6   GLC  101*     Most Recent 2 LFT's:No lab results found.  Most Recent 3 INR's:No lab results found.  Most Recent 3 Troponin's:  Recent Labs   Lab Test  07/01/17   0812  07/01/17   0335  06/30/17 2020   TROPI  0.439*  0.620*  0.039     Most Recent Hemoglobin A1c:No lab results found.,   Results for orders placed or performed during the hospital encounter of 06/30/17   Chest XR,  PA & LAT    Narrative    XR CHEST 2 VW   6/30/2017 9:48 PM     HISTORY: Chest pain    COMPARISON: None.    FINDINGS: The heart is negative.  The lungs are clear. The pulmonary  vasculature is normal.  The bones and soft tissues are unremarkable.      Impression    IMPRESSION: No active infiltrate.        MARLON PAREDES MD          Primary Care Physician   ARGENIS HICKS    Discharge Disposition   Discharged to home  Condition at discharge: Stable    Discharge Orders     MRI Cardiac w/contrast   Administration of IV contrast (contrast agent, dose, and amount) will be tailored to this examination per the appropriate written protocol listed in the Protocol E-Book, or by the supervising imaging  provider.      Reason for your hospital stay   You were admitted to the hospital because your blood tests indicated damage to the heart.  We performed a coronary angiogram to determine if there were plaques in the blood vessels to your heart and if you had a heart attack.  The coronary angiogram was normal.  The damage to your heart may have been due to inflammation, or irritation of the heart muscle which is most commonly due to a viral infection, even if you did not have any symptoms.  You should have a cardiac MRI ([facility] should call you to schedule it) to evaluate your heart further.  You should follow up with Dr. Ryanne Alanis in our cardiology clinic after the MRI.     Adult [facility] Follow-up and recommended labs and tests   Patient is to have a cardiac MRI.  Patient should follow up with Dr. Ryanne Alanis, at the [facility], after the cardiac MRI.    Appointments on [facility] and/or [facility] (with [facility] or [facility] provider or service). Call [phone] if you haven't heard regarding these appointments within 7 days of discharge.     Activity   Your activity upon discharge: Avoid vigorous exertion (jogging, running, biking, etc) until you follow up with Dr. Alanis.     Diet   Follow this diet upon discharge: Low salt (sodium <2400 mg per day) diet.       Discharge Medications   Discharge Medication List as of 7/1/2017  4:11 PM      CONTINUE these medications which have NOT CHANGED    Details   ASPIRIN PO Take 81 mg by mouth daily, Historical      DICLOFENAC SODIUM PO Take 50 mg by mouth 2 times daily, Historical      OMEPRAZOLE PO Take 20 mg by mouth every morning, Historical      HYDROCHLOROTHIAZIDE PO Take 25 mg by mouth daily, Historical      LISINOPRIL PO Take 10 mg by mouth daily, Historical           Allergies   Allergies   Allergen Reactions     Cats Difficulty breathing     Ragweeds      Wheat Gluten [Gluten Meal] Difficulty breathing       1/7/17    Patient examined, chart reviewed and  the above reflects our joint assessment and plans.      Ryanne Alanis MD

## 2017-07-05 LAB
INTERPRETATION ECG - MUSE: NORMAL
INTERPRETATION ECG - MUSE: NORMAL
TROPONIN I SERPL-MCNC: 0.44 UG/L (ref 0–0.04)

## 2019-08-22 ENCOUNTER — HOSPITAL ENCOUNTER (OUTPATIENT)
Facility: CLINIC | Age: 64
Setting detail: OBSERVATION
Discharge: HOME | End: 2019-08-23
Attending: EMERGENCY MEDICINE | Admitting: INTERNAL MEDICINE
Payer: COMMERCIAL

## 2019-08-22 DIAGNOSIS — K92.1 MELENA: ICD-10-CM

## 2019-08-22 DIAGNOSIS — K26.4 GASTROINTESTINAL HEMORRHAGE ASSOCIATED WITH DUODENAL ULCER: Primary | ICD-10-CM

## 2019-08-22 PROBLEM — K92.2 GI BLEED: Status: ACTIVE | Noted: 2019-08-22

## 2019-08-22 LAB
ALBUMIN SERPL-MCNC: 3.4 G/DL (ref 3.4–5)
ALP SERPL-CCNC: 59 U/L (ref 40–150)
ALT SERPL W P-5'-P-CCNC: 24 U/L (ref 0–70)
ANION GAP SERPL CALCULATED.3IONS-SCNC: 8 MMOL/L (ref 3–14)
AST SERPL W P-5'-P-CCNC: 16 U/L (ref 0–45)
BASOPHILS # BLD AUTO: 0 10E9/L (ref 0–0.2)
BASOPHILS NFR BLD AUTO: 0.1 %
BILIRUB SERPL-MCNC: 0.7 MG/DL (ref 0.2–1.3)
BUN SERPL-MCNC: 15 MG/DL (ref 7–30)
CALCIUM SERPL-MCNC: 8.5 MG/DL (ref 8.5–10.1)
CHLORIDE SERPL-SCNC: 100 MMOL/L (ref 94–109)
CO2 SERPL-SCNC: 27 MMOL/L (ref 20–32)
CREAT SERPL-MCNC: 0.93 MG/DL (ref 0.66–1.25)
DIFFERENTIAL METHOD BLD: ABNORMAL
EOSINOPHIL # BLD AUTO: 0 10E9/L (ref 0–0.7)
EOSINOPHIL NFR BLD AUTO: 0.4 %
ERYTHROCYTE [DISTWIDTH] IN BLOOD BY AUTOMATED COUNT: 13.8 % (ref 10–15)
GFR SERPL CREATININE-BSD FRML MDRD: 86 ML/MIN/{1.73_M2}
GLUCOSE SERPL-MCNC: 100 MG/DL (ref 70–99)
HCT VFR BLD AUTO: 39.1 % (ref 40–53)
HGB BLD-MCNC: 12.6 G/DL (ref 13.3–17.7)
IMM GRANULOCYTES # BLD: 0 10E9/L (ref 0–0.4)
IMM GRANULOCYTES NFR BLD: 0.1 %
LACTATE BLD-SCNC: 1.1 MMOL/L (ref 0.7–2)
LIPASE SERPL-CCNC: 95 U/L (ref 73–393)
LYMPHOCYTES # BLD AUTO: 1.3 10E9/L (ref 0.8–5.3)
LYMPHOCYTES NFR BLD AUTO: 19.7 %
MCH RBC QN AUTO: 25.9 PG (ref 26.5–33)
MCHC RBC AUTO-ENTMCNC: 32.2 G/DL (ref 31.5–36.5)
MCV RBC AUTO: 81 FL (ref 78–100)
MONOCYTES # BLD AUTO: 0.6 10E9/L (ref 0–1.3)
MONOCYTES NFR BLD AUTO: 9.1 %
NEUTROPHILS # BLD AUTO: 4.8 10E9/L (ref 1.6–8.3)
NEUTROPHILS NFR BLD AUTO: 70.6 %
NRBC # BLD AUTO: 0 10*3/UL
NRBC BLD AUTO-RTO: 0 /100
PLATELET # BLD AUTO: 207 10E9/L (ref 150–450)
POTASSIUM SERPL-SCNC: 3.6 MMOL/L (ref 3.4–5.3)
PROT SERPL-MCNC: 7.2 G/DL (ref 6.8–8.8)
RBC # BLD AUTO: 4.86 10E12/L (ref 4.4–5.9)
SODIUM SERPL-SCNC: 135 MMOL/L (ref 133–144)
WBC # BLD AUTO: 6.8 10E9/L (ref 4–11)

## 2019-08-22 PROCEDURE — 83605 ASSAY OF LACTIC ACID: CPT | Performed by: EMERGENCY MEDICINE

## 2019-08-22 PROCEDURE — 99284 EMERGENCY DEPT VISIT MOD MDM: CPT | Mod: 25 | Performed by: EMERGENCY MEDICINE

## 2019-08-22 PROCEDURE — 25800030 ZZH RX IP 258 OP 636: Performed by: EMERGENCY MEDICINE

## 2019-08-22 PROCEDURE — 83690 ASSAY OF LIPASE: CPT | Performed by: EMERGENCY MEDICINE

## 2019-08-22 PROCEDURE — 80053 COMPREHEN METABOLIC PANEL: CPT | Performed by: EMERGENCY MEDICINE

## 2019-08-22 PROCEDURE — 96366 THER/PROPH/DIAG IV INF ADDON: CPT | Performed by: EMERGENCY MEDICINE

## 2019-08-22 PROCEDURE — C9113 INJ PANTOPRAZOLE SODIUM, VIA: HCPCS | Performed by: EMERGENCY MEDICINE

## 2019-08-22 PROCEDURE — 85025 COMPLETE CBC W/AUTO DIFF WBC: CPT | Performed by: EMERGENCY MEDICINE

## 2019-08-22 PROCEDURE — 25000128 H RX IP 250 OP 636: Performed by: EMERGENCY MEDICINE

## 2019-08-22 PROCEDURE — 96365 THER/PROPH/DIAG IV INF INIT: CPT | Performed by: EMERGENCY MEDICINE

## 2019-08-22 PROCEDURE — 99220 ZZC INITIAL OBSERVATION CARE,LEVL III: CPT | Mod: Z6 | Performed by: NURSE PRACTITIONER

## 2019-08-22 RX ADMIN — SODIUM CHLORIDE 80 MG: 9 INJECTION, SOLUTION INTRAVENOUS at 20:38

## 2019-08-22 ASSESSMENT — ENCOUNTER SYMPTOMS
CONFUSION: 0
ARTHRALGIAS: 0
EYE REDNESS: 0
DIFFICULTY URINATING: 0
LIGHT-HEADEDNESS: 1
NECK STIFFNESS: 0
VOMITING: 0
FEVER: 0
WEAKNESS: 1
RECTAL PAIN: 0
DIZZINESS: 0
NAUSEA: 0
COLOR CHANGE: 0
HEADACHES: 0
SHORTNESS OF BREATH: 0
ABDOMINAL DISTENTION: 0
BLOOD IN STOOL: 1
ABDOMINAL PAIN: 0

## 2019-08-22 NOTE — ED PROVIDER NOTES
"    [facility] [place] ([facility])    8/22/19     ED 3    History     Chief Complaint   Patient presents with     Rectal Problem     has been \"black stools \" x 2 days, last took Aspirin 81 mg  2 weeks ago. Denies having any hematochezia in the past     The history is provided by the patient and medical records.     Leandro Womack is a 63 year old male who presents with generalized weakness, fatigue for the past week, and black stools for the past 2 days.  He has a history of hypertension, obstructive sleep apnea and obesity.  He noticed that he is approaching 400 pounds and started a water/broth/coffee fast.  About a week into the fast he developed generalized weakness and noticed that he was \"dragging\" himself to get regular tasks done.  He also noticed some epigastric pain as well as constipation throughout the fast which lasted greater than 9 days.  He thought that this was related to the fast and broke with a steak last night.  He had a solid stool yesterday that was entirely black.  Today he ate a breakfast consisting of boiled eggs, bread, cheese and grapes.  Wife states that he is gluten sensitive and cannot tolerate eating bread but still tries anyway.  He subsequently developed watery black stools again today with a little bit of red to it.  Wife was concerned and brought him here for further evaluation.  He continues to have some generalized weakness though the abdominal pain is improved. No prior episodes of hematochezia in the past.  He has had colonoscopy with polypectomy in spring this year, no subsequent issues. The colonoscopy was difficult due to anatomy and he was advised to have this under total sedation in the future.  He has never had endoscopy in the past.  No recent illness, fevers or chills. No recent travel.  He notes having had some hiccuping and heartburn symptoms in the past, no prior diagnosis of GERD.  He is on baby aspirin taking it inconsistently, did take this last " night. No history of GI bleed. Patient is a .     I have reviewed the Medications, Allergies, Past Medical and Surgical History, and Social History in the AG&P system.  PAST MEDICAL HISTORY:   Past Medical History:   Diagnosis Date     Depression      Derangement of medial meniscus      Diverticular disease of colon      Hypertension      Morbid obesity (H)      Osteoarthritis      Vitamin D deficiency        PAST SURGICAL HISTORY: No past surgical history on file.    FAMILY HISTORY: No family history on file.    SOCIAL HISTORY:   Social History     Tobacco Use     Smoking status: Not on file   Substance Use Topics     Alcohol use: Not on file       Patient's Medications   New Prescriptions    No medications on file   Previous Medications    ASPIRIN PO    Take 81 mg by mouth daily    DICLOFENAC SODIUM PO    Take 50 mg by mouth 2 times daily    HYDROCHLOROTHIAZIDE PO    Take 25 mg by mouth daily    LISINOPRIL PO    Take 10 mg by mouth daily    OMEPRAZOLE PO    Take 20 mg by mouth every morning   Modified Medications    No medications on file   Discontinued Medications    No medications on file          Allergies   Allergen Reactions     Cats Difficulty breathing     Ragweeds      Wheat Gluten [Gluten Meal] Difficulty breathing      Review of Systems   Constitutional: Negative for fever.   HENT: Negative for congestion.    Eyes: Negative for redness.   Respiratory: Negative for shortness of breath.    Cardiovascular: Negative for chest pain.   Gastrointestinal: Positive for blood in stool. Negative for abdominal distention, abdominal pain, nausea, rectal pain and vomiting.   Genitourinary: Negative for difficulty urinating.   Musculoskeletal: Negative for arthralgias and neck stiffness.   Skin: Negative for color change.   Neurological: Positive for weakness and light-headedness. Negative for dizziness and headaches.   Psychiatric/Behavioral: Negative for confusion.       Physical Exam   BP: (!)  173/86  Pulse: 96  Temp: 97  F (36.1  C)  Resp: 16  Weight: (!) 180.5 kg (398 lb)  SpO2: 95 %      Physical Exam   Constitutional: He is oriented to person, place, and time. No distress.   HENT:   Head: Atraumatic.   Mouth/Throat: Oropharynx is clear and moist.   Eyes: Pupils are equal, round, and reactive to light. No scleral icterus.   Cardiovascular: Normal heart sounds and intact distal pulses.   Pulmonary/Chest: Breath sounds normal. No respiratory distress.   Abdominal: Soft. Bowel sounds are normal. He exhibits no distension. There is no tenderness.   Genitourinary:   Genitourinary Comments: Guaiac positive.  No anal fissures, hemorrhoids, or other source of bleeding.  No bright red blood.   Musculoskeletal: He exhibits no edema or tenderness.   Neurological: He is alert and oriented to person, place, and time.   Skin: Skin is warm. Capillary refill takes less than 2 seconds. No rash noted. He is not diaphoretic.       ED Course            Labs Ordered and Resulted from Time of ED Arrival Up to the Time of Departure from the ED   COMPREHENSIVE METABOLIC PANEL - Abnormal; Notable for the following components:       Result Value    Glucose 100 (*)     All other components within normal limits   CBC WITH PLATELETS DIFFERENTIAL - Abnormal; Notable for the following components:    Hemoglobin 12.6 (*)     Hematocrit 39.1 (*)     MCH 25.9 (*)     All other components within normal limits   LIPASE   LACTIC ACID WHOLE BLOOD            Assessments & Plan (with Medical Decision Making)   On arrival, patient has normal vital signs.  He is nondistressed.  His abdomen is soft nontender.  He is guaiac positive without any hemorrhoids fissures or other evidence of bleeding.  Initial concern is for upper GI bleed.  Laboratory work-up shows a normal hemoglobin.  BUN is 15.  Lactic normal.  I do not believe he needs any imaging at this time.  Patient will likely need endoscopy but is stable at this time.  I will place him in  the observation unit.  Case was discussed with observation staff.  I also discussed this case with the GI fellow who recommended making the patient n.p.o. after midnight for likely endoscopy in the morning.    I have reviewed the nursing notes.    I have reviewed the findings, diagnosis, plan and need for follow up with the patient.    New Prescriptions    No medications on file       Final diagnoses:   Melena     Mandy ORTEGA, am serving as a trained medical scribe to document services personally performed by Gustavo Breen DO based on the provider's statements to me on August 22, 2019.  This document has been checked and approved by the attending provider.    I, Gustavo Breen DO, was physically present and have reviewed and verified the accuracy of this note documented by Mandy Devlin, medical scribe.     8/22/2019   [facility], [place], EMERGENCY DEPARTMENT     Gustavo Breen DO  08/22/19 2008       Gustavo Breen DO  08/22/19 0044

## 2019-08-23 ENCOUNTER — ANESTHESIA (OUTPATIENT)
Dept: SURGERY | Facility: CLINIC | Age: 64
End: 2019-08-23
Payer: COMMERCIAL

## 2019-08-23 ENCOUNTER — ANESTHESIA EVENT (OUTPATIENT)
Dept: SURGERY | Facility: CLINIC | Age: 64
End: 2019-08-23
Payer: COMMERCIAL

## 2019-08-23 VITALS
WEIGHT: 315 LBS | HEART RATE: 90 BPM | SYSTOLIC BLOOD PRESSURE: 109 MMHG | TEMPERATURE: 98 F | DIASTOLIC BLOOD PRESSURE: 60 MMHG | OXYGEN SATURATION: 99 % | RESPIRATION RATE: 16 BRPM

## 2019-08-23 LAB
APTT PPP: 30 SEC (ref 22–37)
GLUCOSE BLDC GLUCOMTR-MCNC: 96 MG/DL (ref 70–99)
HCT VFR BLD AUTO: 38 % (ref 40–53)
HGB BLD-MCNC: 11.9 G/DL (ref 13.3–17.7)
HGB BLD-MCNC: 11.9 G/DL (ref 13.3–17.7)
HGB BLD-MCNC: 12 G/DL (ref 13.3–17.7)
INR PPP: 1.19 (ref 0.86–1.14)
UPPER GI ENDOSCOPY: NORMAL

## 2019-08-23 PROCEDURE — G0378 HOSPITAL OBSERVATION PER HR: HCPCS

## 2019-08-23 PROCEDURE — 99217 ZZC OBSERVATION CARE DISCHARGE: CPT | Mod: Z6 | Performed by: PHYSICIAN ASSISTANT

## 2019-08-23 PROCEDURE — 25000566 ZZH SEVOFLURANE, EA 15 MIN: Performed by: INTERNAL MEDICINE

## 2019-08-23 PROCEDURE — 27210794 ZZH OR GENERAL SUPPLY STERILE: Performed by: INTERNAL MEDICINE

## 2019-08-23 PROCEDURE — 96376 TX/PRO/DX INJ SAME DRUG ADON: CPT

## 2019-08-23 PROCEDURE — 36000053 ZZH SURGERY LEVEL 2 EA 15 ADDTL MIN - UMMC: Performed by: INTERNAL MEDICINE

## 2019-08-23 PROCEDURE — 40000170 ZZH STATISTIC PRE-PROCEDURE ASSESSMENT II: Performed by: INTERNAL MEDICINE

## 2019-08-23 PROCEDURE — 00000146 ZZHCL STATISTIC GLUCOSE BY METER IP

## 2019-08-23 PROCEDURE — 25000125 ZZHC RX 250: Performed by: NURSE ANESTHETIST, CERTIFIED REGISTERED

## 2019-08-23 PROCEDURE — 25800030 ZZH RX IP 258 OP 636: Performed by: NURSE PRACTITIONER

## 2019-08-23 PROCEDURE — 37000008 ZZH ANESTHESIA TECHNICAL FEE, 1ST 30 MIN: Performed by: INTERNAL MEDICINE

## 2019-08-23 PROCEDURE — 85730 THROMBOPLASTIN TIME PARTIAL: CPT | Performed by: NURSE PRACTITIONER

## 2019-08-23 PROCEDURE — 25800030 ZZH RX IP 258 OP 636: Performed by: NURSE ANESTHETIST, CERTIFIED REGISTERED

## 2019-08-23 PROCEDURE — 88305 TISSUE EXAM BY PATHOLOGIST: CPT | Performed by: INTERNAL MEDICINE

## 2019-08-23 PROCEDURE — 25000128 H RX IP 250 OP 636: Performed by: NURSE PRACTITIONER

## 2019-08-23 PROCEDURE — 37000009 ZZH ANESTHESIA TECHNICAL FEE, EACH ADDTL 15 MIN: Performed by: INTERNAL MEDICINE

## 2019-08-23 PROCEDURE — 25000132 ZZH RX MED GY IP 250 OP 250 PS 637: Performed by: NURSE PRACTITIONER

## 2019-08-23 PROCEDURE — C9113 INJ PANTOPRAZOLE SODIUM, VIA: HCPCS | Performed by: NURSE PRACTITIONER

## 2019-08-23 PROCEDURE — 85014 HEMATOCRIT: CPT | Performed by: NURSE PRACTITIONER

## 2019-08-23 PROCEDURE — 25000128 H RX IP 250 OP 636: Performed by: NURSE ANESTHETIST, CERTIFIED REGISTERED

## 2019-08-23 PROCEDURE — 85610 PROTHROMBIN TIME: CPT | Performed by: NURSE PRACTITIONER

## 2019-08-23 PROCEDURE — 25000125 ZZHC RX 250: Performed by: INTERNAL MEDICINE

## 2019-08-23 PROCEDURE — 36000051 ZZH SURGERY LEVEL 2 1ST 30 MIN - UMMC: Performed by: INTERNAL MEDICINE

## 2019-08-23 PROCEDURE — 85018 HEMOGLOBIN: CPT | Performed by: NURSE PRACTITIONER

## 2019-08-23 PROCEDURE — 71000016 ZZH RECOVERY PHASE 1 LEVEL 3 FIRST HR: Performed by: INTERNAL MEDICINE

## 2019-08-23 PROCEDURE — 36415 COLL VENOUS BLD VENIPUNCTURE: CPT | Performed by: NURSE PRACTITIONER

## 2019-08-23 RX ORDER — PANTOPRAZOLE SODIUM 40 MG/1
40 TABLET, DELAYED RELEASE ORAL 2 TIMES DAILY
Qty: 60 TABLET | Refills: 2 | Status: SHIPPED | OUTPATIENT
Start: 2019-08-23 | End: 2019-09-22

## 2019-08-23 RX ORDER — SODIUM CHLORIDE 9 MG/ML
INJECTION, SOLUTION INTRAVENOUS CONTINUOUS
Status: DISCONTINUED | OUTPATIENT
Start: 2019-08-23 | End: 2019-08-23 | Stop reason: HOSPADM

## 2019-08-23 RX ORDER — FENTANYL CITRATE 50 UG/ML
INJECTION, SOLUTION INTRAMUSCULAR; INTRAVENOUS PRN
Status: DISCONTINUED | OUTPATIENT
Start: 2019-08-23 | End: 2019-08-23

## 2019-08-23 RX ORDER — LIDOCAINE 40 MG/G
CREAM TOPICAL
Status: DISCONTINUED | OUTPATIENT
Start: 2019-08-23 | End: 2019-08-23 | Stop reason: HOSPADM

## 2019-08-23 RX ORDER — ONDANSETRON 4 MG/1
4 TABLET, ORALLY DISINTEGRATING ORAL EVERY 6 HOURS PRN
Status: DISCONTINUED | OUTPATIENT
Start: 2019-08-23 | End: 2019-08-23 | Stop reason: HOSPADM

## 2019-08-23 RX ORDER — NALOXONE HYDROCHLORIDE 0.4 MG/ML
.1-.4 INJECTION, SOLUTION INTRAMUSCULAR; INTRAVENOUS; SUBCUTANEOUS
Status: DISCONTINUED | OUTPATIENT
Start: 2019-08-23 | End: 2019-08-23 | Stop reason: HOSPADM

## 2019-08-23 RX ORDER — LIDOCAINE HYDROCHLORIDE 20 MG/ML
INJECTION, SOLUTION INFILTRATION; PERINEURAL PRN
Status: DISCONTINUED | OUTPATIENT
Start: 2019-08-23 | End: 2019-08-23

## 2019-08-23 RX ORDER — ACETAMINOPHEN 650 MG/1
650 SUPPOSITORY RECTAL EVERY 4 HOURS PRN
Status: DISCONTINUED | OUTPATIENT
Start: 2019-08-23 | End: 2019-08-23 | Stop reason: HOSPADM

## 2019-08-23 RX ORDER — PROPOFOL 10 MG/ML
INJECTION, EMULSION INTRAVENOUS PRN
Status: DISCONTINUED | OUTPATIENT
Start: 2019-08-23 | End: 2019-08-23

## 2019-08-23 RX ORDER — SODIUM CHLORIDE, SODIUM LACTATE, POTASSIUM CHLORIDE, CALCIUM CHLORIDE 600; 310; 30; 20 MG/100ML; MG/100ML; MG/100ML; MG/100ML
INJECTION, SOLUTION INTRAVENOUS CONTINUOUS PRN
Status: DISCONTINUED | OUTPATIENT
Start: 2019-08-23 | End: 2019-08-23

## 2019-08-23 RX ORDER — ONDANSETRON 2 MG/ML
4 INJECTION INTRAMUSCULAR; INTRAVENOUS EVERY 6 HOURS PRN
Status: DISCONTINUED | OUTPATIENT
Start: 2019-08-23 | End: 2019-08-23 | Stop reason: HOSPADM

## 2019-08-23 RX ORDER — LISINOPRIL 10 MG/1
10 TABLET ORAL DAILY
Status: DISCONTINUED | OUTPATIENT
Start: 2019-08-23 | End: 2019-08-23 | Stop reason: HOSPADM

## 2019-08-23 RX ORDER — ACETAMINOPHEN 325 MG/1
650 TABLET ORAL EVERY 4 HOURS PRN
Status: DISCONTINUED | OUTPATIENT
Start: 2019-08-23 | End: 2019-08-23 | Stop reason: HOSPADM

## 2019-08-23 RX ORDER — ASPIRIN 81 MG/1
81 TABLET, CHEWABLE ORAL DAILY
Status: DISCONTINUED | OUTPATIENT
Start: 2019-08-23 | End: 2019-08-23

## 2019-08-23 RX ADMIN — PANTOPRAZOLE SODIUM 40 MG: 40 INJECTION, POWDER, FOR SOLUTION INTRAVENOUS at 08:00

## 2019-08-23 RX ADMIN — FENTANYL CITRATE 100 MCG: 50 INJECTION, SOLUTION INTRAMUSCULAR; INTRAVENOUS at 11:11

## 2019-08-23 RX ADMIN — SODIUM CHLORIDE, POTASSIUM CHLORIDE, SODIUM LACTATE AND CALCIUM CHLORIDE: 600; 310; 30; 20 INJECTION, SOLUTION INTRAVENOUS at 11:02

## 2019-08-23 RX ADMIN — PROPOFOL 200 MG: 10 INJECTION, EMULSION INTRAVENOUS at 11:11

## 2019-08-23 RX ADMIN — SUGAMMADEX 600 MG: 100 INJECTION, SOLUTION INTRAVENOUS at 11:29

## 2019-08-23 RX ADMIN — ROCURONIUM BROMIDE 30 MG: 10 INJECTION INTRAVENOUS at 11:19

## 2019-08-23 RX ADMIN — MIDAZOLAM 2 MG: 1 INJECTION INTRAMUSCULAR; INTRAVENOUS at 11:02

## 2019-08-23 RX ADMIN — LISINOPRIL 10 MG: 10 TABLET ORAL at 08:00

## 2019-08-23 RX ADMIN — SODIUM CHLORIDE: 9 INJECTION, SOLUTION INTRAVENOUS at 06:02

## 2019-08-23 RX ADMIN — Medication 200 MG: at 11:11

## 2019-08-23 RX ADMIN — LIDOCAINE HYDROCHLORIDE 100 MG: 20 INJECTION, SOLUTION INFILTRATION; PERINEURAL at 11:11

## 2019-08-23 RX ADMIN — ONDANSETRON 4 MG: 2 INJECTION INTRAMUSCULAR; INTRAVENOUS at 11:28

## 2019-08-23 RX ADMIN — FENTANYL CITRATE 100 MCG: 50 INJECTION, SOLUTION INTRAMUSCULAR; INTRAVENOUS at 11:19

## 2019-08-23 RX ADMIN — PROPOFOL 100 MG: 10 INJECTION, EMULSION INTRAVENOUS at 11:19

## 2019-08-23 ASSESSMENT — ENCOUNTER SYMPTOMS: DYSRHYTHMIAS: 0

## 2019-08-23 ASSESSMENT — PAIN DESCRIPTION - DESCRIPTORS: DESCRIPTORS: DISCOMFORT

## 2019-08-23 NOTE — DISCHARGE SUMMARY
Discharge Summary    Leandro Womack MRN# [id number]   YOB: 1955 Age: 63 year old     Date of Admission:  8/22/2019  Date of Discharge:  8/23/2019  Admitting Physician:  Gustavo Breen DO  Discharge Physician:  CHRISTINE PEREZ  Discharging Service:  Emergency Department Observation Unit     Primary Provider: Sukhi Juarez          Discharge Diagnosis:   GI bleed              Discharge Disposition:   Discharged to home           Condition on Discharge:   Discharge condition: Stable   Code status on discharge:            Procedures:   Endoscopy results:   Antral erosion. One dudoenal bulb clean based (Andreas III) ulcer. Biopsies obtained for H-pylori from gastric antrum and body              Discharge Medications:     Current Discharge Medication List      CONTINUE these medications which have NOT CHANGED    Details   LISINOPRIL PO Take 10 mg by mouth daily      ASPIRIN PO Take 81 mg by mouth daily      DICLOFENAC SODIUM PO Take 50 mg by mouth 2 times daily      HYDROCHLOROTHIAZIDE PO Take 25 mg by mouth daily      OMEPRAZOLE PO Take 20 mg by mouth every morning                   Consultations:   Consultation during this admission received from gastroenterology             Brief History of Illness:   Leandro Womack is a 63 year old male admitted on 8/22/2019. He has a history of hypertension, obstructive sleep apnea and obesity. He presents to the ED with generalized weakness, fatigue for the past week, and black stools for the past 2 days.          Hospital Course:   1. Possible upper GI bleed:  2. Obesity:  A week and a half ago, patient reports starting a water/broth/coffee fast as he noted he was over 400 pounds. Patient noted in the last few days, he is more fatigued and generalized weakness. He broke his fast two days ago and had a steak. Patient reports pain in the epigastric region.  He had a solid stool yesterday that was entirely black.   He thought that this was related to the fast and broke  with a steak last night. Today he ate a breakfast consisting of boiled eggs, bread, cheese and grapes. He subsequently developed watery black stools again today with a little bit of red to it.  No prior episodes of hematochezia in the past.  He has had colonoscopy with polypectomy in spring this year, no subsequent issues. The colonoscopy was difficult due to anatomy and he was advised to have this under total sedation in the future.  He has never had endoscopy in the past.  No recent illness, fevers or chills. No recent travel.  He notes having had some hiccuping and heartburn symptoms today,  No history of GI bleed. PTA baby aspirin. Vitals:BP: (!) 173/86, Pulse: 96, Temp: 97  F (36.1  C), Resp: 16, SpO2: 95 % Labs: Na 135, K3.6, Cr 0.93, GFR 86, BUN 15, LFTS normal, Lactic acid: 1.1, Lipase 95, bg 100, WBC 6.8, Hgb 12.6, ( Last hgb recorded by [facility] was 13.8 on 10/2018) in hematocrit 39.1, plt 207, Guaiac positive. Medications:  Protonix 80mg IV.  Imaging: none Consults: GI Plan: Admit to ED observation. Serial hemoglobins stable, hgb 12.0 at discharge. GI consult and patient underwent EGD which showed antral erosion and one dudoenal bulb clean based (Andreas III) ulcer. Biopsies obtained for H-pylori from gastric antrum and body. Patient able to tolerated po. He is hemodynamically stable. GI recommendation as below:  - Use PO PPI BID and continue for 2-3 months  - Follow with gastric biopsies results  - Strict NSAIDs avoidance  - Monitor for signs of GI bleeding     3. Hypertension;173/86. Patient currently on Lisinopril 10 mg. Previously, had been on 25 mg of hydrochlorothiazide in addition.  - Continue PTA Lisinopril  - If BP remains elevated, may need to add hydrochlorothiazide      4. Obstructive sleep apnea: Patient uses CPAP at home. Declined hospital CPAP.             Final Day of Progress before Discharge:       Physical Exam:  Blood pressure 109/60, pulse 90, temperature 98  F (36.7  C),  temperature source Oral, resp. rate 16, weight (!) 180.5 kg (398 lb), SpO2 99 %.    EXAM:  Physical Exam   Constitutional: Pt is oriented to person, place, and time.Pt appears well-developed and well-nourished.   HENT:   Head: Normocephalic and atraumatic.   Eyes: Conjunctivae are normal. Pupils are equal, round, and reactive to light.   Neck: Normal range of motion. Neck supple.   Cardiovascular: Normal rate, regular rhythm, normal heart sounds and intact distal pulses.    Pulmonary/Chest: Effort normal and breath sounds normal. No respiratory distress. Pt has no wheezes. Pt has no rales  Abdominal: Soft. Bowel sounds are normal. Pt exhibits no distension and no mass. No tenderness. Pt has no rebound and no guarding.   Musculoskeletal: Normal range of motion. Pt exhibits no edema.   Neurological: Pt is alert and oriented to person, place, and time. Normal reflexes.   Skin: Skin is warm and dry. No rash noted.   Psychiatric: Pt has a normal mood and affect. Behavior is normal. Judgment and thought content normal.             Data:  All laboratory data reviewed             Significant Results:   None  Results for orders placed or performed during the hospital encounter of 08/22/19   Comprehensive metabolic panel   Result Value Ref Range    Sodium 135 133 - 144 mmol/L    Potassium 3.6 3.4 - 5.3 mmol/L    Chloride 100 94 - 109 mmol/L    Carbon Dioxide 27 20 - 32 mmol/L    Anion Gap 8 3 - 14 mmol/L    Glucose 100 (H) 70 - 99 mg/dL    Urea Nitrogen 15 7 - 30 mg/dL    Creatinine 0.93 0.66 - 1.25 mg/dL    GFR Estimate 86 >60 mL/min/[1.73_m2]    GFR Estimate If Black >90 >60 mL/min/[1.73_m2]    Calcium 8.5 8.5 - 10.1 mg/dL    Bilirubin Total 0.7 0.2 - 1.3 mg/dL    Albumin 3.4 3.4 - 5.0 g/dL    Protein Total 7.2 6.8 - 8.8 g/dL    Alkaline Phosphatase 59 40 - 150 U/L    ALT 24 0 - 70 U/L    AST 16 0 - 45 U/L   CBC with platelets differential   Result Value Ref Range    WBC 6.8 4.0 - 11.0 10e9/L    RBC Count 4.86 4.4 - 5.9  10e12/L    Hemoglobin 12.6 (L) 13.3 - 17.7 g/dL    Hematocrit 39.1 (L) 40.0 - 53.0 %    MCV 81 78 - 100 fl    MCH 25.9 (L) 26.5 - 33.0 pg    MCHC 32.2 31.5 - 36.5 g/dL    RDW 13.8 10.0 - 15.0 %    Platelet Count 207 150 - 450 10e9/L    Diff Method Automated Method     % Neutrophils 70.6 %    % Lymphocytes 19.7 %    % Monocytes 9.1 %    % Eosinophils 0.4 %    % Basophils 0.1 %    % Immature Granulocytes 0.1 %    Nucleated RBCs 0 0 /100    Absolute Neutrophil 4.8 1.6 - 8.3 10e9/L    Absolute Lymphocytes 1.3 0.8 - 5.3 10e9/L    Absolute Monocytes 0.6 0.0 - 1.3 10e9/L    Absolute Eosinophils 0.0 0.0 - 0.7 10e9/L    Absolute Basophils 0.0 0.0 - 0.2 10e9/L    Abs Immature Granulocytes 0.0 0 - 0.4 10e9/L    Absolute Nucleated RBC 0.0    Lipase   Result Value Ref Range    Lipase 95 73 - 393 U/L   Lactic acid whole blood   Result Value Ref Range    Lactic Acid 1.1 0.7 - 2.0 mmol/L   Hematocrit   Result Value Ref Range    Hematocrit 38.0 (L) 40.0 - 53.0 %   INR   Result Value Ref Range    INR 1.19 (H) 0.86 - 1.14   Partial thromboplastin time   Result Value Ref Range    PTT 30 22 - 37 sec   Hemoglobin   Result Value Ref Range    Hemoglobin 11.9 (L) 13.3 - 17.7 g/dL   Hemoglobin   Result Value Ref Range    Hemoglobin 11.9 (L) 13.3 - 17.7 g/dL   Hemoglobin   Result Value Ref Range    Hemoglobin 12.0 (L) 13.3 - 17.7 g/dL   Glucose by meter   Result Value Ref Range    Glucose 96 70 - 99 mg/dL   GI LUMINAL ADULT IP CONSULT: Patient to be seen: Routine within 24 hrs; Call back #: [phone]; Possible GI bleed; Consultant may enter orders: Yes; Requesting provider? Attending physician; Name: Liana Chavez MD     8/23/2019  9:08 AM      GASTROENTEROLOGY CONSULTATION      Date of Admission: 8/22/2019       Date of Consult: 8/23/2019          Chief Complaint:   We were asked by Vj Rowan MD to evaluate this patient with   melena         ASSESSMENT AND RECOMMENDATIONS:   Assessment:  Leandro Womack is a  63 year old male with a history of morbid   obesity, sigmoid diverticulosis, and HTN, admitted with weakness   and melena, found to have a hgb drop of 1.5 g down to 11.9 g/dl   concerning for upper GI bleeding. GI service has been consulted   for further evaluation and management.  # Concern for UGI bleeding (Shreyas-Blatchford Bleeding Score 3):     # Acute posthemorrhagic anemia:  Patient reports two days history of melena and epigastric pain.   No prior history of similar condition, or obvious risk factors   for GI bleeding. He remains hemodynamically stable, and abdominal   exam was remarkable for epigastric tenderness. Labs notable for   Hgb drop down to 11.9 from baseline around 13.5 g/dl, normal   BUN/creatinine and lactate. Suspect UGI bleeding, differential   includes PUD, severe gastritis, esophagitis, Dieulafoy's lesion,   etc. Plan for upper endoscopic evaluation in OR today.      Recommendations  - Continue with monitoring hemodynamic measures, and IVFs  - Please keep NPO, plan for EGD in OR today  - Continue with IV PPI therapy  - Trend Hgb, goal >7 from GI perspective  - Strict NSAIDs avoidance, use mechanical DVT prophylaxis  - Further recommendations pending endoscopic evaluation  - GI team will continue to follow with you     Gastroenterology follow up recommendations: TBD      Patient care plan discussed with Dr. Sweet, GI staff physician.   Thank you for involving us in this patient's care. Please do not   hesitate to contact the GI service with any questions or   concerns.     Liana Adair MD  GI fellow  #[id number]  ------------------------------------------------------------------  -------------------------------------------------   History is obtained from the patient and the medical record.          History of Present Illness:   Leandro Womack is a 63 year old male with a history of morbid   obesity, sigmoid diverticulosis, and HTN, admitted with weakness   and melena, found to have a hgb drop of  1.5 g down to 11.9 g/dl   concerning for upper GI bleeding. GI service has been consulted   for further evaluation and management.  Patient reports two days history of black stools and epigastric   abdominal pain. Of note, he is color-blind and usually having   difficulties differentiating red/green. He was also feeling weak   for 5-6 days while following a strict fasting regimen to help   losing weight. Denies N/V, abdominal distension, dizziness,   lightheadedness or other systemic symptoms. No history of NSAIDs   or AC use. Denies alcohol, smoking. No prior history of GI   bleeding or UGI endoscopic evaluation. Patient had a colonoscopy   03/19 notable for sigmoid diverticulosis and a 7 mm ascending   colon tubular adenoma.             Past Medical History:   Reviewed and edited as appropriate  Past Medical History:   Diagnosis Date     Depression      Derangement of medial meniscus      Diverticular disease of colon      Hypertension      Morbid obesity (H)      Osteoarthritis      Vitamin D deficiency             Past Surgical History:   Reviewed and edited as appropriate   No past surgical history on file.         Previous Endoscopy:   No results found for this or any previous visit.         Social History:   Reviewed and edited as appropriate  Social History     Socioeconomic History     Marital status:      Spouse name: Not on file     Number of children: Not on file     Years of education: Not on file     Highest education level: Not on file   Occupational History     Not on file   Social Needs     Financial resource strain: Not on file     Food insecurity:     Worry: Not on file     Inability: Not on file     Transportation needs:     Medical: Not on file     Non-medical: Not on file   Tobacco Use     Smoking status: Not on file   Substance and Sexual Activity     Alcohol use: Not on file     Drug use: Not on file     Sexual activity: Not on file   Lifestyle     Physical activity:     Days per week:  Not on file     Minutes per session: Not on file     Stress: Not on file   Relationships     Social connections:     Talks on phone: Not on file     Gets together: Not on file     Attends [employer] service: Not on file     Active member of club or organization: Not on file     Attends meetings of clubs or organizations: Not on file     Relationship status: Not on file     Intimate partner violence:     Fear of current or ex partner: Not on file     Emotionally abused: Not on file     Physically abused: Not on file     Forced sexual activity: Not on file   Other Topics Concern     Not on file   Social History Narrative     Not on file            Family History:   Reviewed and edited as appropriate  No family history on file.        Allergies:   Reviewed and edited as appropriate     Allergies   Allergen Reactions     Cats Difficulty breathing     Ragweeds      Wheat Gluten [Gluten Meal] Difficulty breathing            Medications:     Current Facility-Administered Medications   Medication     acetaminophen (TYLENOL) Suppository 650 mg     acetaminophen (TYLENOL) tablet 650 mg     lidocaine (LMX4) cream     lidocaine 1 % 0.1-1 mL     lisinopril (PRINIVIL/ZESTRIL) tablet 10 mg     naloxone (NARCAN) injection 0.1-0.4 mg     ondansetron (ZOFRAN-ODT) ODT tab 4 mg    Or     ondansetron (ZOFRAN) injection 4 mg     pantoprazole (PROTONIX) 40 mg IV push injection     sodium chloride (PF) 0.9% PF flush 3 mL     sodium chloride (PF) 0.9% PF flush 3 mL     sodium chloride 0.9% infusion             Review of Systems:   A complete review of systems was performed and is negative except   as noted in the HPI           Physical Exam:   /76 (BP Location: Left arm)   Pulse 92   Temp 98.1  F   (36.7  C) (Oral)   Resp 16   Wt (!) 180.5 kg (398 lb)   SpO2   95%   Wt:   Wt Readings from Last 2 Encounters:   08/22/19 (!) 180.5 kg (398 lb)   06/30/17 (!) 192.1 kg (423 lb 6.4 oz)      Constitutional: cooperative, pleasant, not  dyspneic/diaphoretic,   no acute distress, morbidly obese  Eyes: Sclera anicteric/injected  Neck: supple, thyroid normal size  CV: No edema  Respiratory: Unlabored breathing  Lymph: No axillary, submandibular, supraclavicular or inguinal   lymphadenopathy  Abd:  Nondistended, +bs, no hepatosplenomegaly, tender epigastric   area, no peritoneal signs, KELIN with no blood  Skin: warm, perfused, no jaundice  Neuro: AAO x 3, No asterixis  Psych: Normal affect           Data:   Labs and imaging below were independently reviewed and   interpreted    BMP  Recent Labs   Lab 08/22/19 1913      POTASSIUM 3.6   CHLORIDE 100   MARYURI 8.5   CO2 27   BUN 15   CR 0.93   *     CBC  Recent Labs   Lab 08/23/19  0601 08/23/19  0124 08/22/19 1913   WBC  --   --  6.8   RBC  --   --  4.86   HGB 11.9* 11.9* 12.6*   HCT  --  38.0* 39.1*   MCV  --   --  81   MCH  --   --  25.9*   MCHC  --   --  32.2   RDW  --   --  13.8   PLT  --   --  207     INR  Recent Labs   Lab 08/23/19 0124   INR 1.19*     LFTs  Recent Labs   Lab 08/22/19 1913   ALKPHOS 59   AST 16   ALT 24   BILITOTAL 0.7   PROTTOTAL 7.2   ALBUMIN 3.4      PANC  Recent Labs   Lab 08/22/19 1913   LIPASE 95       Imaging       No results found for this or any previous visit (from the past 48 hour(s)).             Pending Results:   Unresulted Labs Ordered in the Past 30 Days of this Admission     Date and Time Order Name Status Description    8/23/2019 1126 Surgical pathology exam In process                   Discharge Instructions and Follow-Up:   No discharge procedures on file.       Attestation:  Sarai Lilly PA-C.

## 2019-08-23 NOTE — PLAN OF CARE
- Nausea/vomiting (diarrhea if present) improved. No  - Tolerating oral intake to maintain hydration. No. Patent is NPO.  - Vital signs normal or at patient baseline and orthostatic vitals are normal and patient not lightheaded with standing. Yes  - Diagnostic tests and consults completed and resulted if ordered. No  - Adequate pain control on oral analgesia. Yes  - Tolerating oral antibiotics if ordered. N/a  - Safe disposition plan has been identified. No

## 2019-08-23 NOTE — ED NOTES
"[facility], [place]   ED Nurse to Floor Handoff     Leandro Womack is a 63 year old male who speaks English and lives with a spouse,  in a home  They arrived in the ED by car from home    ED Chief Complaint: Rectal Problem (has been \"black stools \" x 2 days, last took Aspirin 81 mg  2 weeks ago. Denies having any hematochezia in the past)    ED Dx;   Final diagnoses:   Melena         Needed?: No    Allergies:   Allergies   Allergen Reactions     Cats Difficulty breathing     Ragweeds      Wheat Gluten [Gluten Meal] Difficulty breathing   .  Past Medical Hx:   Past Medical History:   Diagnosis Date     Depression      Derangement of medial meniscus      Diverticular disease of colon      Hypertension      Morbid obesity (H)      Osteoarthritis      Vitamin D deficiency       Baseline Mental status: WDL  Current Mental Status changes: at basesline    Infection present or suspected this encounter: no  Sepsis suspected: No  Isolation type: No active isolations     Activity level - Baseline/Home:  Independent  Activity Level - Current:   Independent    Bariatric equipment needed?: No    In the ED these meds were given:   Medications   pantoprazole (PROTONIX) 80 mg in sodium chloride 0.9 % 100 mL intermittent infusion (80 mg Intravenous New Bag 8/22/19 2038)       Drips running?  Yes    Home pump  No    Current LDAs  Peripheral IV 08/22/19 Right Upper forearm (Active)   Site Assessment WDL 8/22/2019  7:22 PM   Line Status Saline locked 8/22/2019  7:22 PM   Phlebitis Scale 0-->no symptoms 8/22/2019  7:22 PM   Number of days: 0       Left Radial Interventional Procedure Access (Active)   Number of days: 782       Labs results:   Labs Ordered and Resulted from Time of ED Arrival Up to the Time of Departure from the ED   COMPREHENSIVE METABOLIC PANEL - Abnormal; Notable for the following components:       Result Value    Glucose 100 (*)     All other components within normal limits   CBC " WITH PLATELETS DIFFERENTIAL - Abnormal; Notable for the following components:    Hemoglobin 12.6 (*)     Hematocrit 39.1 (*)     MCH 25.9 (*)     All other components within normal limits   LIPASE   LACTIC ACID WHOLE BLOOD       Imaging Studies: No results found for this or any previous visit (from the past 24 hour(s)).    Recent vital signs:   BP (!) 173/86   Pulse 96   Temp 97  F (36.1  C) (Oral)   Resp 16   Wt (!) 180.5 kg (398 lb)   SpO2 95%     Somis Coma Scale Score: 15 (08/22/19 1807)       Cardiac Rhythm: Other  Pt needs tele? No  Skin/wound Issues: None    Code Status: Full Code    Pain control: good    Nausea control: pt had none    Abnormal labs/tests/findings requiring intervention: see emr    Family present during ED course? Yes   Family Comments/Social Situation comments: n/a    Tasks needing completion: None    , [place], RN  [phone] [facility]  [phone] [facility] ED

## 2019-08-23 NOTE — PLAN OF CARE
Observation Goals:  -Nausea/vomiting (diarrhea if present) improved:Denies nausea/diarrhea  - Tolerating oral intake to maintain hydration: Tolerating clear diet, just advanced to regular diet.  - Vital signs normal or at patient baseline and orthostatic vitals are normal and patient not lightheaded with standing: YES  - Diagnostic tests and consults completed and resulted if ordered: EGD completed, result pending  - Adequate pain control on oral analgesia: Yes, denies pain  - Tolerating oral antibiotics if ordered: NA  - Safe disposition plan has been identified: No    Vitals stable, tolerating RA. Denies pain/SOB. Up to bathroom with SBA. Voiding adequately. No BM today. Continue with cares and update MD with any changes.

## 2019-08-23 NOTE — CONSULTS
GASTROENTEROLOGY CONSULTATION      Date of Admission: 8/22/2019       Date of Consult: 8/23/2019          Chief Complaint:   We were asked by Vj Rowan MD to evaluate this patient with melena         ASSESSMENT AND RECOMMENDATIONS:   Assessment:  Leandro Womack is a 63 year old male with a history of morbid obesity, sigmoid diverticulosis, and HTN, admitted with weakness and melena, found to have a hgb drop of 1.5 g down to 11.9 g/dl concerning for upper GI bleeding. GI service has been consulted for further evaluation and management.  # Concern for UGI bleeding (Shreyas-Blatchford Bleeding Score 3):   # Acute posthemorrhagic anemia:  Patient reports two days history of melena and epigastric pain. No prior history of similar condition, or obvious risk factors for GI bleeding. He remains hemodynamically stable, and abdominal exam was remarkable for epigastric tenderness. Labs notable for Hgb drop down to 11.9 from baseline around 13.5 g/dl, normal BUN/creatinine and lactate. Suspect UGI bleeding, differential includes PUD, severe gastritis, esophagitis, Dieulafoy's lesion, etc. Plan for upper endoscopic evaluation in OR today.      Recommendations  - Continue with monitoring hemodynamic measures, and IVFs  - Please keep NPO, plan for EGD in OR today  - Continue with IV PPI therapy  - Trend Hgb, goal >7 from GI perspective  - Strict NSAIDs avoidance, use mechanical DVT prophylaxis  - Further recommendations pending endoscopic evaluation  - GI team will continue to follow with you     Gastroenterology follow up recommendations: TBD      Patient care plan discussed with Dr. Sweet, GI staff physician. Thank you for involving us in this patient's care. Please do not hesitate to contact the GI service with any questions or concerns.     Liana Adair MD  GI fellow  #[id number]  -------------------------------------------------------------------------------------------------------------------   History is obtained from  the patient and the medical record.          History of Present Illness:   Leandro Womack is a 63 year old male with a history of morbid obesity, sigmoid diverticulosis, and HTN, admitted with weakness and melena, found to have a hgb drop of 1.5 g down to 11.9 g/dl concerning for upper GI bleeding. GI service has been consulted for further evaluation and management.  Patient reports two days history of black stools and epigastric abdominal pain. Of note, he is color-blind and usually having difficulties differentiating red/green. He was also feeling weak for 5-6 days while following a strict fasting regimen to help losing weight. Denies N/V, abdominal distension, dizziness, lightheadedness or other systemic symptoms. No history of NSAIDs or AC use. Denies alcohol, smoking. No prior history of GI bleeding or UGI endoscopic evaluation. Patient had a colonoscopy 03/19 notable for sigmoid diverticulosis and a 7 mm ascending colon tubular adenoma.             Past Medical History:   Reviewed and edited as appropriate  Past Medical History:   Diagnosis Date     Depression      Derangement of medial meniscus      Diverticular disease of colon      Hypertension      Morbid obesity (H)      Osteoarthritis      Vitamin D deficiency             Past Surgical History:   Reviewed and edited as appropriate   No past surgical history on file.         Previous Endoscopy:   No results found for this or any previous visit.         Social History:   Reviewed and edited as appropriate  Social History     Socioeconomic History     Marital status:      Spouse name: Not on file     Number of children: Not on file     Years of education: Not on file     Highest education level: Not on file   Occupational History     Not on file   Social Needs     Financial resource strain: Not on file     Food insecurity:     Worry: Not on file     Inability: Not on file     Transportation needs:     Medical: Not on file     Non-medical: Not on file    Tobacco Use     Smoking status: Not on file   Substance and Sexual Activity     Alcohol use: Not on file     Drug use: Not on file     Sexual activity: Not on file   Lifestyle     Physical activity:     Days per week: Not on file     Minutes per session: Not on file     Stress: Not on file   Relationships     Social connections:     Talks on phone: Not on file     Gets together: Not on file     Attends Church service: Not on file     Active member of club or organization: Not on file     Attends meetings of clubs or organizations: Not on file     Relationship status: Not on file     Intimate partner violence:     Fear of current or ex partner: Not on file     Emotionally abused: Not on file     Physically abused: Not on file     Forced sexual activity: Not on file   Other Topics Concern     Not on file   Social History Narrative     Not on file            Family History:   Reviewed and edited as appropriate  No family history on file.        Allergies:   Reviewed and edited as appropriate     Allergies   Allergen Reactions     Cats Difficulty breathing     Ragweeds      Wheat Gluten [Gluten Meal] Difficulty breathing            Medications:     Current Facility-Administered Medications   Medication     acetaminophen (TYLENOL) Suppository 650 mg     acetaminophen (TYLENOL) tablet 650 mg     lidocaine (LMX4) cream     lidocaine 1 % 0.1-1 mL     lisinopril (PRINIVIL/ZESTRIL) tablet 10 mg     naloxone (NARCAN) injection 0.1-0.4 mg     ondansetron (ZOFRAN-ODT) ODT tab 4 mg    Or     ondansetron (ZOFRAN) injection 4 mg     pantoprazole (PROTONIX) 40 mg IV push injection     sodium chloride (PF) 0.9% PF flush 3 mL     sodium chloride (PF) 0.9% PF flush 3 mL     sodium chloride 0.9% infusion             Review of Systems:   A complete review of systems was performed and is negative except as noted in the HPI           Physical Exam:   /76 (BP Location: Left arm)   Pulse 92   Temp 98.1  F (36.7  C) (Oral)    Resp 16   Wt (!) 180.5 kg (398 lb)   SpO2 95%   Wt:   Wt Readings from Last 2 Encounters:   08/22/19 (!) 180.5 kg (398 lb)   06/30/17 (!) 192.1 kg (423 lb 6.4 oz)      Constitutional: cooperative, pleasant, not dyspneic/diaphoretic, no acute distress, morbidly obese  Eyes: Sclera anicteric/injected  Neck: supple, thyroid normal size  CV: No edema  Respiratory: Unlabored breathing  Lymph: No axillary, submandibular, supraclavicular or inguinal lymphadenopathy  Abd:  Nondistended, +bs, no hepatosplenomegaly, tender epigastric area, no peritoneal signs, KELIN with no blood  Skin: warm, perfused, no jaundice  Neuro: AAO x 3, No asterixis  Psych: Normal affect           Data:   Labs and imaging below were independently reviewed and interpreted    BMP  Recent Labs   Lab 08/22/19 1913      POTASSIUM 3.6   CHLORIDE 100   MARYURI 8.5   CO2 27   BUN 15   CR 0.93   *     CBC  Recent Labs   Lab 08/23/19  0601 08/23/19  0124 08/22/19 1913   WBC  --   --  6.8   RBC  --   --  4.86   HGB 11.9* 11.9* 12.6*   HCT  --  38.0* 39.1*   MCV  --   --  81   MCH  --   --  25.9*   MCHC  --   --  32.2   RDW  --   --  13.8   PLT  --   --  207     INR  Recent Labs   Lab 08/23/19 0124   INR 1.19*     LFTs  Recent Labs   Lab 08/22/19 1913   ALKPHOS 59   AST 16   ALT 24   BILITOTAL 0.7   PROTTOTAL 7.2   ALBUMIN 3.4      PANC  Recent Labs   Lab 08/22/19 1913   LIPASE 95       Imaging

## 2019-08-23 NOTE — ANESTHESIA PREPROCEDURE EVALUATION
Anesthesia Pre-Procedure Evaluation    Patient: Leandro Womack   MRN:     [id number] Gender:   male   Age:    63 year old :      1955        Preoperative Diagnosis: Upper gastroentestinal bleed   Procedure(s):  Upper Endoscopy     Past Medical History:   Diagnosis Date     Depression      Derangement of medial meniscus      Diverticular disease of colon      Hypertension      Morbid obesity (H)      Osteoarthritis      Vitamin D deficiency       History reviewed. No pertinent surgical history.       Anesthesia Evaluation     . Pt has not had prior anesthetic            ROS/MED HX    ENT/Pulmonary:     (+)sleep apnea, uses CPAP , . .    Neurologic:  - neg neurologic ROS     Cardiovascular:     (+) hypertension----. : . . . :. . Previous cardiac testing Echodate:2017results:Technically difficult study.Extremely poor acoustic windows.  Limited information was obtained during study.  Global and regional left ventricular function is normal with an EF of 55-60%.  No regional wall motion abnormalities are seen.  The inferior vena cava is normal.  No pericardial effusion is present.date: results:ECG reviewed date: results:Cath date: 17 results:No CAD, no valve dz         (-) CAD, taking anticoagulants/antiplatelets, arrhythmias and valvular problems/murmurs   METS/Exercise Tolerance:     Hematologic:  - neg hematologic  ROS       Musculoskeletal:  - neg musculoskeletal ROS       GI/Hepatic:  - neg GI/hepatic ROS       Renal/Genitourinary:  - ROS Renal section negative       Endo:     (+) Obesity, .      Psychiatric:  - neg psychiatric ROS       Infectious Disease:  - neg infectious disease ROS       Malignancy:      - no malignancy   Other:                         PHYSICAL EXAM:   Mental Status/Neuro: A/A/O   Airway: Facies: Feasible  Mallampati: I  Mouth/Opening: Full  TM distance: > 6 cm  Neck ROM: Full   Respiratory: Auscultation: CTAB     Resp. Rate: Normal     Resp. Effort: Normal      CV: Rhythm:  Regular  Rate: Age appropriate  Heart: Normal Sounds  Edema: None   Comments:      Dental: Normal Dentition Habitus: Obesity; Morbid               LABS:  CBC:   Lab Results   Component Value Date    WBC 6.8 08/22/2019    WBC 7.2 06/30/2017    HGB 11.9 (L) 08/23/2019    HGB 11.9 (L) 08/23/2019    HCT 38.0 (L) 08/23/2019    HCT 39.1 (L) 08/22/2019     08/22/2019     06/30/2017     BMP:   Lab Results   Component Value Date     08/22/2019     06/30/2017    POTASSIUM 3.6 08/22/2019    POTASSIUM 4.1 06/30/2017    CHLORIDE 100 08/22/2019    CHLORIDE 103 06/30/2017    CO2 27 08/22/2019    CO2 26 06/30/2017    BUN 15 08/22/2019    BUN 17 06/30/2017    CR 0.93 08/22/2019    CR 1.01 06/30/2017     (H) 08/22/2019     (H) 06/30/2017     COAGS:   Lab Results   Component Value Date    PTT 30 08/23/2019    INR 1.19 (H) 08/23/2019     POC: No results found for: BGM, HCG, HCGS  OTHER:   Lab Results   Component Value Date    LACT 1.1 08/22/2019    MARYURI 8.5 08/22/2019    MAG 1.8 06/30/2017    ALBUMIN 3.4 08/22/2019    PROTTOTAL 7.2 08/22/2019    ALT 24 08/22/2019    AST 16 08/22/2019    ALKPHOS 59 08/22/2019    BILITOTAL 0.7 08/22/2019    LIPASE 95 08/22/2019        Preop Vitals    BP Readings from Last 3 Encounters:   08/23/19 117/76   07/01/17 114/78    Pulse Readings from Last 3 Encounters:   08/23/19 92   06/30/17 104      Resp Readings from Last 3 Encounters:   08/23/19 16   07/01/17 18    SpO2 Readings from Last 3 Encounters:   08/23/19 95%   07/01/17 98%      Temp Readings from Last 1 Encounters:   08/23/19 36.7  C (98.1  F) (Oral)    Ht Readings from Last 1 Encounters:   No data found for Ht      Wt Readings from Last 1 Encounters:   08/22/19 (!) 180.5 kg (398 lb)    There is no height or weight on file to calculate BMI.     LDA:  Peripheral IV 08/22/19 Right Upper forearm (Active)   Site Assessment WDL 8/23/2019  8:00 AM   Line Status Saline locked 8/23/2019  8:00 AM   Phlebitis Scale  0-->no symptoms 8/23/2019  8:00 AM   Infiltration Scale 0 8/23/2019  8:00 AM   Infiltration Site Treatment Method  None 8/23/2019  8:00 AM   Extravasation? No 8/23/2019  8:00 AM   Number of days: 1       Left Radial Interventional Procedure Access (Active)   Number of days: 783       ETT (adult) (Active)   Number of days: 0        Assessment:   ASA SCORE: 3    H&P: History and physical reviewed and following examination; no interval change.   Smoking Status:  Non-Smoker/Unknown   NPO Status: NPO Appropriate     Plan:   Anes. Type:  General   Pre-Medication: None   Induction:  IV (Standard)   Airway: ETT; Oral   Access/Monitoring: PIV   Maintenance: Balanced     Postop Plan:   Postop Pain: Opioids  Postop Sedation/Airway: Not planned     PONV Management:   Adult Risk Factors:, Non-Smoker, Postop Opioids   Prevention: Ondansetron, Dexamethasone     CONSENT: Direct conversation   Plan and risks discussed with: Patient   Blood Products: Consent Deferred (Minimal Blood Loss)                   Vianey Gomes MD

## 2019-08-23 NOTE — PLAN OF CARE
Discharge instructions and medication reviewed to patient and his wife, both verbalized the understanding. Off  floor via wheelchair.

## 2019-08-23 NOTE — ANESTHESIA CARE TRANSFER NOTE
Patient: Leandro Womack    Procedure(s):  Esophagogastroduodenoscopy, With Biopsy    Diagnosis: Upper gastroentestinal bleed  Diagnosis Additional Information: No value filed.    Anesthesia Type:   General     Note:  Airway :Face Mask  Patient transferred to:PACU  Comments:   Patient vital signs: stable    Airway: none    Monitors applied. Alert and conversing. Report to RN.        Handoff Report: Identifed the Patient, Identified the Reponsible Provider, Reviewed the pertinent medical history, Discussed the surgical course, Reviewed Intra-OP anesthesia mangement and issues during anesthesia, Set expectations for post-procedure period and Allowed opportunity for questions and acknowledgement of understanding      Vitals: (Last set prior to Anesthesia Care Transfer)    CRNA VITALS  8/23/2019 1109 - 8/23/2019 1150      8/23/2019             Resp Rate (observed):  2  (Abnormal)                 Electronically Signed By: SAY Mcbride CRNA  August 23, 2019  11:50 AM

## 2019-08-23 NOTE — ANESTHESIA POSTPROCEDURE EVALUATION
Anesthesia POST Procedure Evaluation    Patient: Leandro Womack   MRN:     [id number] Gender:   male   Age:    63 year old :      1955        Preoperative Diagnosis: Upper gastroentestinal bleed   Procedure(s):  Esophagogastroduodenoscopy, With Biopsy   Postop Comments: No value filed.       Anesthesia Type:  Not documented  General    Reportable Event: NO     PAIN: Uncomplicated   Sign Out status: Comfortable, Well controlled pain     PONV: No PONV   Sign Out status:  No Nausea or Vomiting     Neuro/Psych: Uneventful perioperative course   Sign Out Status: Preoperative baseline; Age appropriate mentation     Airway/Resp.: Uneventful perioperative course   Sign Out Status: Non labored breathing, age appropriate RR; Resp. Status within EXPECTED Parameters     CV: Uneventful perioperative course   Sign Out status: Appropriate BP and perfusion indices; Appropriate HR/Rhythm     Disposition:   Sign Out in:  PACU  Disposition:  Phase II; Home  Recovery Course: Uneventful  Follow-Up: Not required           Last Anesthesia Record Vitals:  CRNA VITALS  2019 1109 - 2019 1207      2019             Resp Rate (observed):  2  (Abnormal)           Last PACU Vitals:  Vitals Value Taken Time   /67 2019 12:00 PM   Temp 36.8  C (98.3  F) 2019 11:45 AM   Pulse 93 2019 12:00 PM   Resp 14 2019 12:00 PM   SpO2 98 % 2019 12:06 PM   Temp src     NIBP     Pulse     SpO2     Resp     Temp     Ht Rate     Temp 2     Vitals shown include unvalidated device data.      Electronically Signed By: Vianey Gomes MD, 2019, 12:07 PM

## 2019-08-23 NOTE — PROGRESS NOTES
The patient was admitted to the ED for dark stools/gi bleed evaluation.  Serial troponins are stable.  Vitals stable.  He is currently having an endoscopy by GI so I am unable to personally examine him.  Being treated with protonix.  Will await endoscopy results and gi recommendation.

## 2019-08-23 NOTE — H&P
[facility], [place]    History and Physical - Emergency Department Observation Unit       Date of Admission:  8/22/2019    Assessment & Plan   Leandro Womack is a 63 year old male admitted on 8/22/2019. He has a history of hypertension, obstructive sleep apnea and obesity. He presents to the ED with generalized weakness, fatigue for the past week, and black stools for the past 2 days.    1. Possible upper GI bleed:  2. Obesity:  A week and a half ago, patient reports starting a water/broth/coffee fast as he noted he was over 400 pounds. Patient noted in the last few days, he is more fatigued and generalized weakness. He broke his fast two days ago and had a steak. Patient reports pain in the epigastric region.  He had a solid stool yesterday that was entirely black.   He thought that this was related to the fast and broke with a steak last night. Today he ate a breakfast consisting of boiled eggs, bread, cheese and grapes. He subsequently developed watery black stools again today with a little bit of red to it.  No prior episodes of hematochezia in the past.  He has had colonoscopy with polypectomy in spring this year, no subsequent issues. The colonoscopy was difficult due to anatomy and he was advised to have this under total sedation in the future.  He has never had endoscopy in the past.  No recent illness, fevers or chills. No recent travel.  He notes having had some hiccuping and heartburn symptoms today,  No history of GI bleed. PTA baby aspirin. Vitals:BP: (!) 173/86, Pulse: 96, Temp: 97  F (36.1  C), Resp: 16, SpO2: 95 % Labs: Na 135, K3.6, Cr 0.93, GFR 86, BUN 15, LFTS normal, Lactic acid: 1.1, Lipase 95, bg 100, WBC 6.8, Hgb 12.6, ( Last hgb recorded by [facility] was 13.8 on 10/2018) in hematocrit 39.1, plt 207, Guaiac positive. Medications:  Protonix 80mg IV.  Imaging: none Consults: GI Plan: Admit to ED observation for serial hemoglobins and GI consult in am.   - Hold PTA  "aspirin  - Serial hemoglobins/hct q 6 hours (12.6-11.9)  - INR/PTT with next hemoglobin  - Continuous IV hydration  - Protonix IV BID  - VS q 4 hours  - Orthostatics  - GI consult for possible endoscopy   - NPO  - Strict intake and output  - Stool studies, enteric precautions.    3. Hypertension;173/86. Patient currently on Lisinopril 10 mg. Previously, had been on 25 mg of hydrochlorothiazide in addition.  - Continue PTA Lisinopril  - If BP remains elevated, may need to add hydrochlorothiazide     4. Obstructive sleep apnea: Patient uses CPAP at home. Declined hospital CPAP.     Diet: NPO per Anesthesia Guidelines for Procedure/Surgery Except for: Meds    DVT Prophylaxis: Low Risk/Ambulatory with no VTE prophylaxis indicated  Disla Catheter: not present    Disposition Plan   Expected discharge: Tomorrow, recommended to prior living arrangement once hemoglobin stable.  Entered: SAY Valenzuela CNP 08/23/2019, 12:25 AM     The patient's care was discussed with the Bedside Nurse, Patient and Dr. Breen, ED physician..    SAY Razo, NP  Emergency Department  [phone] Ex [id number]  ______________________________________________________________________    Chief Complaint   Black tarry stool.    History is obtained from the patient    History of Present Illness   Per ED note: \"Leandro Womack is a 63 year old male who presents with generalized weakness, fatigue for the past week, and black stools for the past 2 days.  He has a history of hypertension, obstructive sleep apnea and obesity.  He noticed that he is approaching 400 pounds and started a water/broth/coffee fast.  About a week into the fast he developed generalized weakness and noticed that he was \"dragging\" himself to get regular tasks done.  He also noticed some epigastric pain as well as constipation throughout the fast which lasted greater than 9 days.  He thought that this was related to the fast and broke with a steak last night.  He had a solid " "stool yesterday that was entirely black.  Today he ate a breakfast consisting of boiled eggs, bread, cheese and grapes.  Wife states that he is gluten sensitive and cannot tolerate eating bread but still tries anyway.  He subsequently developed watery black stools again today with a little bit of red to it.  Wife was concerned and brought him here for further evaluation.  He continues to have some generalized weakness though the abdominal pain is improved. No prior episodes of hematochezia in the past.  He has had colonoscopy with polypectomy in spring this year, no subsequent issues. The colonoscopy was difficult due to anatomy and he was advised to have this under total sedation in the future.  He has never had endoscopy in the past.  No recent illness, fevers or chills. No recent travel.  He notes having had some hiccuping and heartburn symptoms in the past, no prior diagnosis of GERD.  He is on baby aspirin taking it inconsistently, did take this last night. No history of GI bleed. Patient is a . \"      Review of Systems    The 10 point Review of Systems is negative other than noted in the HPI or here. Weakness, fatigue, black tarry stool.    Past Medical History    I have reviewed this patient's medical history and updated it with pertinent information if needed.   Past Medical History:   Diagnosis Date     Depression      Derangement of medial meniscus      Diverticular disease of colon      Hypertension      Morbid obesity (H)      Osteoarthritis      Vitamin D deficiency        Past Surgical History   I have reviewed this patient's surgical history and updated it with pertinent information if needed.  No past surgical history on file.    Social History   I have reviewed this patient's social history and updated it with pertinent information if needed.  Social History     Tobacco Use     Smoking status: Not on file   Substance Use Topics     Alcohol use: Not on file     Drug use: Not on file "       Family History   I have reviewed this patient's family history and updated it with pertinent information if needed.   No family history on file.    Prior to Admission Medications   Prior to Admission Medications   Prescriptions Last Dose Informant Patient Reported? Taking?   ASPIRIN PO 8/20/2019  Yes No   Sig: Take 81 mg by mouth daily   DICLOFENAC SODIUM PO Unknown at Unknown time  Yes No   Sig: Take 50 mg by mouth 2 times daily   HYDROCHLOROTHIAZIDE PO Unknown at Unknown time  Yes No   Sig: Take 25 mg by mouth daily   LISINOPRIL PO 8/22/2019 at Unknown time  Yes Yes   Sig: Take 10 mg by mouth daily   OMEPRAZOLE PO Unknown at Unknown time  Yes No   Sig: Take 20 mg by mouth every morning      Facility-Administered Medications: None     Allergies   Allergies   Allergen Reactions     Cats Difficulty breathing     Ragweeds      Wheat Gluten [Gluten Meal] Difficulty breathing       Physical Exam   Vital Signs: Temp: 98.4  F (36.9  C) Temp src: Oral BP: 117/62 Pulse: 91   Resp: 16 SpO2: 97 % O2 Device: None (Room air)    Weight: 398 lbs 0 oz    Constitutional: He is oriented to person, place, and time. No distress.   HENT:   Head: Atraumatic.   Mouth/Throat: Oropharynx is clear and moist.   Eyes: Pupils are equal, round, and reactive to light. No scleral icterus.   Cardiovascular: Normal heart sounds and intact distal pulses.   Pulmonary/Chest: Breath sounds normal. No respiratory distress.   Abdominal: Soft. Bowel sounds are normal. He exhibits no distension. There is no tenderness.   Genitourinary:   Genitourinary Per ED MD:  No anal fissures, hemorrhoids, or other source of bleeding.  No bright red blood.   Musculoskeletal: He exhibits no edema or tenderness.   Neurological: He is alert and oriented to person, place, and time.   Skin: Skin is warm.     Data   Data reviewed today: I reviewed all medications, new labs and imaging results over the last 24 hours.     Recent Labs   Lab 08/22/19  1913   WBC 6.8   HGB  12.6*   MCV 81         POTASSIUM 3.6   CHLORIDE 100   CO2 27   BUN 15   CR 0.93   ANIONGAP 8   MARYURI 8.5   *   ALBUMIN 3.4   PROTTOTAL 7.2   BILITOTAL 0.7   ALKPHOS 59   ALT 24   AST 16   LIPASE 95     Most Recent 3 CBC's:  Recent Labs   Lab Test 08/22/19 1913 06/30/17 2020   WBC 6.8 7.2   HGB 12.6* 14.4   MCV 81 83    251     Most Recent 3 BMP's:  Recent Labs   Lab Test 08/22/19 1913 06/30/17 2020    140   POTASSIUM 3.6 4.1   CHLORIDE 100 103   CO2 27 26   BUN 15 17   CR 0.93 1.01   ANIONGAP 8 11   MARYURI 8.5 9.6   * 101*     Most Recent 2 LFT's:  Recent Labs   Lab Test 08/22/19 1913   AST 16   ALT 24   ALKPHOS 59   BILITOTAL 0.7     No results found for this or any previous visit (from the past 24 hour(s)).

## 2019-08-23 NOTE — ED NOTES
Observation Brochure and Video     observation status based on provider's order.  Observation brochure was given and the video watched. Patient/Family stated understanding. Questions answered.  Karmen Paris RN

## 2019-08-23 NOTE — OP NOTE
Gastroenterology Endoscopy Suite Brief Operative Note    Procedure:  Upper endoscopy   Post-operative diagnosis:  Gastric and duodenal ulcers   Staff Physician:  Dr. Silvano Sweet   Fellow/Assistant(s):  Liana Adair    Specimens:  Please see final procedure note for further details.   Findings:  Antral erosion. One dudoenal bulb clean based (Andreas III) ulcer. Biopsies obtained for H-pylori from gastric antrum and body   Complications:  None.   Condition:  Stable   Recommendations  Diet:  Return to previous diet  PPI:  PPI po BID  Anti-coagulants/platelets:  N/A  Octreotide:  N/A  Discharge Planning:   Pending hospital course:  - Use PO PPI BID and continue for 2-3 months  - Follow with gastric biopsies results  - Strict NSAIDs avoidance  - Monitor for signs of GI bleeding

## 2019-08-23 NOTE — ED NOTES
Patient has  [place] to Observation  order. Patient has been given the Observation brochure and watched observation video.      Chelsea Oshea RN

## 2019-08-26 ENCOUNTER — PATIENT OUTREACH (OUTPATIENT)
Dept: CARE COORDINATION | Facility: CLINIC | Age: 64
End: 2019-08-26

## 2019-08-26 LAB — COPATH REPORT: NORMAL

## 2019-08-26 NOTE — PROGRESS NOTES
Brief GI note:  Reviewed gastric biopsies results, notable for chronic H-pylori gastritis. I called the patient and discussed the pathology findings and plan of care. Bismuth quadruple therapy ordered for two weeks.     Discussed with GI staff, Dr. Sweet.    Liana Adair MD  GI fellow  #[phone]

## 2019-08-27 ENCOUNTER — TELEPHONE (OUTPATIENT)
Facility: CLINIC | Age: 64
End: 2019-08-27

## 2019-08-27 NOTE — PROGRESS NOTES
Patient was contacted by an RN for post DC follow up so no duplicate post DC follow up call will be made     Physical Therapy/Occupational Therapy

## 2019-08-27 NOTE — TELEPHONE ENCOUNTER
Brief GI note:  Patient is a 63 y.o M with morbid obesity and diverticulosis, admitted with melena and anemia and underwent EGD 8/23 which showed duodenal bulb ulcer and erosive gastropathy. Biopsies from gastric antrum and body were taken for histology.    Reviewed gastric biopsies results, notable for chronic H-pylori gastritis. I called the patient and discussed the pathology findings, and the needed course of action including treatment for H-pylori. Bismuth quadruple therapy ordered for two weeks and sent to patient's pharmacy.        Findings and care discussed with GI staff, Dr. Sweet.     Liana Adair MD  GI fellow  #[id number]

## (undated) DEVICE — ENDO TUBING CO2 SMARTCAP STERILE DISP 100145CO2EXT

## (undated) DEVICE — PACK ENDOSCOPY GI CUSTOM UMMC

## (undated) DEVICE — KIT ENDO FIRST STEP DISINFECTANT 200ML W/POUCH EP-4

## (undated) DEVICE — SUCTION MANIFOLD DORNOCH ULTRA CART UL-CL500

## (undated) DEVICE — ENDO FORCEP ENDOJAW BIOPSY 2.8MMX230CM FB-220U

## (undated) DEVICE — ENDO CAP AND TUBING STERILE FOR ENDOGATOR  100130

## (undated) DEVICE — ENDO BITE BLOCK ADULT OMNI-BLOC

## (undated) DEVICE — SOL WATER IRRIG 1000ML BOTTLE 2F7114

## (undated) DEVICE — PAD CHUX UNDERPAD 23X24" 7136

## (undated) RX ORDER — ONDANSETRON 2 MG/ML
INJECTION INTRAMUSCULAR; INTRAVENOUS
Status: DISPENSED
Start: 2019-08-23

## (undated) RX ORDER — NITROGLYCERIN 5 MG/ML
VIAL (ML) INTRAVENOUS
Status: DISPENSED
Start: 2017-07-01

## (undated) RX ORDER — HYDROMORPHONE HYDROCHLORIDE 1 MG/ML
INJECTION, SOLUTION INTRAMUSCULAR; INTRAVENOUS; SUBCUTANEOUS
Status: DISPENSED
Start: 2019-08-23

## (undated) RX ORDER — NICARDIPINE HYDROCHLORIDE 2.5 MG/ML
INJECTION INTRAVENOUS
Status: DISPENSED
Start: 2017-07-01

## (undated) RX ORDER — FENTANYL CITRATE 50 UG/ML
INJECTION, SOLUTION INTRAMUSCULAR; INTRAVENOUS
Status: DISPENSED
Start: 2019-08-23

## (undated) RX ORDER — LIDOCAINE HYDROCHLORIDE 20 MG/ML
INJECTION, SOLUTION EPIDURAL; INFILTRATION; INTRACAUDAL; PERINEURAL
Status: DISPENSED
Start: 2019-08-23

## (undated) RX ORDER — PROPOFOL 10 MG/ML
INJECTION, EMULSION INTRAVENOUS
Status: DISPENSED
Start: 2019-08-23

## (undated) RX ORDER — HEPARIN SODIUM 1000 [USP'U]/ML
INJECTION, SOLUTION INTRAVENOUS; SUBCUTANEOUS
Status: DISPENSED
Start: 2017-07-01